# Patient Record
(demographics unavailable — no encounter records)

---

## 2024-10-09 NOTE — DATA REVIEWED
[FreeTextEntry1] : 7/29/24 NFR, bilat sMMG: Scattered FG. Two spiculated masses with associated architectural distortion are seen in the central inner right breast. The more anterior mass at the 9:00 axis demonstrates associated calcifications. No suspicious mass, suspicious microcalcifications, or other sign of malignancy is identified in the left breast. Additional scattered benign type calcifications are noted. FERNANDO grade 0. Impression: Spiculated masses with associated architectural distortion in the inner right breast. The patient will be recalled for additional mmg views and targeted right breast US for further eval. BR0  9/17/24 NFR, R dMMG and limited US: Scattered FG. Three medial / approx 3:00 RIGHT breast lesions,  by up approx 5.5cm: Mid posterior/N8, 3:00 position: 10 mm spiculated mass. Mid/N5, 3:00 position: 4 mm spiculated mass or focal distortion. Anterior/N2 subareolar position: 6 mm spiculated mass R US: Irregular hypoechoic masses correspond to mammographic abnormalities above: 2-3:00 N4 position: 10 x 8mm hypoechoic mass. 1-2:00 N4 position: 8mm indistinct hypoechoic mass. 1-2:00 N1 position: 10 mm hypoechoic shadowing mass No suspicious RIGHT axillary lymphadenopathy. Impression: Three suspicious medial RIGHT breast masses. Ultrasound-guided biopsy is recommended of two outer perimeter masses or all three RIGHT breast masses in medial RIGHT breast. BR5  9/26/24 [facility], Right 2:00 4N US guided bx: Invasive lobular carcinoma, pleomorphic type, measures at least 9mm. Solid carcinoma in situ Gr2. Lymphovascular permeation by tumor not seen.  Right 1:00 1N: Invasive lobular carcinoma similar to that in part 1, measures at least 10mm. Solid carcinoma in situ Gr2. No lymphovascular permeation by tumor is seen. Calcs present in benign breast tissue. ER+ 90%, ND+ 80%. Her2 negative. Concordant. Rec surgical or oncologic management

## 2024-10-09 NOTE — HISTORY OF PRESENT ILLNESS
[FreeTextEntry1] : Referred by Dr. Tanvir Dhillon PCP Nik Cannon MD  Patient is a 69-year-old white female here today for consultation for recent diagnosis of Right ILC x2 foci, 2:00 4N, 1:00 1N, ER+ 90%, WV+ 80%. Her2 negative (2 out of 3 abnormal lesions) Pt denies any breast lesions, discharge or masses.  7/29/24 NFR, bilat sMMG: Scattered FG. Two spiculated masses with associated architectural distortion are seen in the central inner right breast. The more anterior mass at the 9:00 axis demonstrates associated calcifications. No suspicious mass, suspicious microcalcifications, or other sign of malignancy is identified in the left breast. Additional scattered benign type calcifications are noted. FERNANDO grade 0. Impression: Spiculated masses with associated architectural distortion in the inner right breast. The patient will be recalled for additional mmg views and targeted right breast US for further eval. BR0  9/17/24 NFR, R dMMG and limited US: Scattered FG. Three medial / approx 3:00 RIGHT breast lesions,  by up approx 5.5cm: Mid posterior/N8, 3:00 position: 10 mm spiculated mass. Mid/N5, 3:00 position: 4 mm spiculated mass or focal distortion. Anterior/N2 subareolar position: 6 mm spiculated mass R US: Irregular hypoechoic masses correspond to mammographic abnormalities above: 2-3:00 N4 position: 10 x 8mm hypoechoic mass. 1-2:00 N4 position: 8mm indistinct hypoechoic mass. 1-2:00 N1 position: 10 mm hypoechoic shadowing mass No suspicious RIGHT axillary lymphadenopathy. Impression: Three suspicious medial RIGHT breast masses. Ultrasound-guided biopsy is recommended of two outer perimeter masses or all three RIGHT breast masses in medial RIGHT breast. BR5  9/26/24 [facility], Right 2:00 4N US guided bx: Invasive lobular carcinoma, pleomorphic type, measures at least 9mm. Solid carcinoma in situ Gr2. Lymphovascular permeation by tumor not seen.  Right 1:00 1N: Invasive lobular carcinoma similar to that in part 1, measures at least 10mm. Solid carcinoma in situ Gr2. No lymphovascular permeation by tumor is seen. Calcs present in benign breast tissue. ER+ 90%, WV+ 80%. Her2 negative. Concordant. Rec surgical or oncologic management  Fhx: ?Abdominal Cancer: Father, 93 y/o.  Pt lives in [place].

## 2024-10-09 NOTE — PAST MEDICAL HISTORY
[Menarche Age ____] : age at menarche was [unfilled] [Menopause Age____] : age at menopause was [unfilled] [History of Hormone Replacement Treatment] : has a history of hormone replacement treatment [Approximately ___] : the LMP was approximately [unfilled] [Total Preg ___] : G[unfilled] [Live Births ___] : P[unfilled]  [Living ___] : Living: [unfilled] [Age At Live Birth ___] : Age at live birth: [unfilled] [FreeTextEntry7] : Yes, 4 months [FreeTextEntry8] : Yes

## 2024-10-09 NOTE — ASSESSMENT
[FreeTextEntry1] : Referred by Dr. Tanvir Dhillon PCP Nik Cannon MD  Patient is a 69-year-old white female here today for consultation for recent diagnosis of Right ILC x2 foci, 2:00 4N, 1:00 1N, ER+ 90%, PA+ 80%. Her2 negative (2 out of 3 abnormal lesions) Pt denies any breast lesions, discharge or masses.  7/29/24 NFR, bilat sMMG: Scattered FG. Two spiculated masses with associated architectural distortion are seen in the central inner right breast. The more anterior mass at the 9:00 axis demonstrates associated calcifications. No suspicious mass, suspicious microcalcifications, or other sign of malignancy is identified in the left breast. Additional scattered benign type calcifications are noted. FERNANDO grade 0. Impression: Spiculated masses with associated architectural distortion in the inner right breast. The patient will be recalled for additional mmg views and targeted right breast US for further eval. BR0  9/17/24 NFR, R dMMG and limited US: Scattered FG. Three medial / approx 3:00 RIGHT breast lesions,  by up approx 5.5cm: Mid posterior/N8, 3:00 position: 10 mm spiculated mass. Mid/N5, 3:00 position: 4 mm spiculated mass or focal distortion. Anterior/N2 subareolar position: 6 mm spiculated mass R US: Irregular hypoechoic masses correspond to mammographic abnormalities above: 2-3:00 N4 position: 10 x 8mm hypoechoic mass. 1-2:00 N4 position: 8mm indistinct hypoechoic mass. 1-2:00 N1 position: 10 mm hypoechoic shadowing mass No suspicious RIGHT axillary lymphadenopathy. Impression: Three suspicious medial RIGHT breast masses. Ultrasound-guided biopsy is recommended of two outer perimeter masses or all three RIGHT breast masses in medial RIGHT breast. BR5  9/26/24 [facility], Right 2:00 4N US guided bx: Invasive lobular carcinoma, pleomorphic type, measures at least 9mm. Solid carcinoma in situ Gr2. Lymphovascular permeation by tumor not seen.  Right 1:00 1N: Invasive lobular carcinoma similar to that in part 1, measures at least 10mm. Solid carcinoma in situ Gr2. No lymphovascular permeation by tumor is seen. Calcs present in benign breast tissue. ER+ 90%, PA+ 80%. Her2 negative. Concordant. Rec surgical or oncologic management  Fhx: ?Abdominal Cancer: Father, 93 y/o.  Pt lives in [place].   CBE:  Right: Slight ecchymosis from bx x2 sites. Breast is nodular irineo at 1:00 3N nodular area, ? lesion vs post bx change.  No discrete mass. Left negative. No axillary or SC lymphadenopathy.   Very long discussion with patient regarding the biopsy results from 9/26/24 from [facility] showing ILC  of the R breast at the 1 o'clock, 1 cm FTN, Grade 2 , ER 90% , PA 80% and [id number] neg , measuring 1cm. A second site also with ILC at 2:00 4N. The extremes were biopsied but there was a third abnormal lesion.  Reviewed extensively the options of mastectomy vs lumpectomy with the pros and cons for both. With mastectomy, options of immediate or delayed reconstruction (expander/implant vs autologous flap) discussed and reconstruction handout given and contact for plastic surgeon. Also discussed usually no radiation with mastectomy but pending final pathology.  Discussed usually no reexcision with mastectomy. Reviewed will need drain(s) with mastectomy. With lumpectomy, will need radiation and treatment was reviewed. Would need localization. Also discussed will need negative margins and if too close or positive, may need reexcision(s).   If unable to achieve negative margins with reexcision(s), she  may need mastectomy. Padmini may be an oncoplastic surgery candidate since lesions are in UIQ however the multifocal nature of her disease may be too extensive for lumpectomy.  Rec MRI for further evaluation.   Reviewed SLNBx (dye and radioactive tracer). With mastectomy if SLN is positive on permanent, may need delay ALND or postmastectomy radiation.  With lumpectomy, given Z-11, no ALND with positive LN as long as <4 since adjuvant radiation is to be given. If SLN are clinically suspicious, may check frozen and pending results, may proceed to ALND.  Highly unlikely given negative on clinical exam and on studies.  Discussed risks and complications including lymphedema and handout given out. Discussed probably will need hormonal therapy.  [id number] was neg on one of the tumors but may need receptors on the other tumors to determine need of  herceptin.  Need for chemotherapy pending results of final pathology. May need additional tumor analysis such as oncotype Dx to determine need for chemotherapy. Discussed survival with BCT vs mastectomy is the same and recurrence risk with mastectomy is not 0%.  Rec genetic testing and SOZO. Pt is clinical stage 1. She is leaning towards Mastectomy and prophylactic on the left. Discussed no evidence of increased survival with prophlyactic mastectomy. Pt wondering about NSM, one of the lesions is 1 Cm ftn and discussed would check SA tissue and if positive, may need delayed removal of NAC. ALso would be cosmetic only and could also have NAC recon with tattooing as another option.  Reviewed with patient her exercise regimen. She does exercise regularly. Discussed exercise oncology and the importance of regular exercise, 300min per week, including cardiac and resistance training to overall health and also reduction of 20-30% for recurrence. Discussed virtual exercise programs, [employer]  Contact given for Dr. Efren Calloway for activity and further exercise oncology review. Pt used to smoke as a teenager and now Vapes, rec cessation. Also rec discontinue prempro, she has been on it for 20 years.   PLAN: MRI Genetic testing and SOZO at [facility] Appt with Dr Chaney, leaning towards bilat mastectomy and possible VICENTE.  Stop Prempro

## 2024-10-09 NOTE — PHYSICAL EXAM
[Normocephalic] : normocephalic [Atraumatic] : atraumatic [Supple] : supple [No Supraclavicular Adenopathy] : no supraclavicular adenopathy [No Thyromegaly] : no thyromegaly [Examined in the supine and seated position] : examined in the supine and seated position [Bra Size: ___] : Bra Size: [unfilled] [No dominant masses] : no dominant masses in right breast  [No dominant masses] : no dominant masses left breast [No Nipple Retraction] : no left nipple retraction [No Nipple Discharge] : no left nipple discharge [No Axillary Lymphadenopathy] : no left axillary lymphadenopathy [No Edema] : no edema [No Swelling] : no swelling [Full ROM] : full range of motion [No Rashes] : no rashes [No Ulceration] : no ulceration [de-identified] : Right: Slight ecchymosis from bx x2 sites. Breast is nodular irineo at 1:00 3N nodular area, ? lesion vs post bx change.  No discrete mass. Left negative. [TextEntry] : Psych: Loquacious, Appropriate, NAD, A&Ox3 Neuro: Intact grossly, gait normal

## 2024-10-09 NOTE — ASSESSMENT
[FreeTextEntry1] : Referred by Dr. Tanvir Dhillon PCP Nik Cannon MD  Patient is a 69-year-old white female here today for consultation for recent diagnosis of Right ILC x2 foci, 2:00 4N, 1:00 1N, ER+ 90%, OK+ 80%. Her2 negative (2 out of 3 abnormal lesions) Pt denies any breast lesions, discharge or masses.  7/29/24 NFR, bilat sMMG: Scattered FG. Two spiculated masses with associated architectural distortion are seen in the central inner right breast. The more anterior mass at the 9:00 axis demonstrates associated calcifications. No suspicious mass, suspicious microcalcifications, or other sign of malignancy is identified in the left breast. Additional scattered benign type calcifications are noted. FERNANDO grade 0. Impression: Spiculated masses with associated architectural distortion in the inner right breast. The patient will be recalled for additional mmg views and targeted right breast US for further eval. BR0  9/17/24 NFR, R dMMG and limited US: Scattered FG. Three medial / approx 3:00 RIGHT breast lesions,  by up approx 5.5cm: Mid posterior/N8, 3:00 position: 10 mm spiculated mass. Mid/N5, 3:00 position: 4 mm spiculated mass or focal distortion. Anterior/N2 subareolar position: 6 mm spiculated mass R US: Irregular hypoechoic masses correspond to mammographic abnormalities above: 2-3:00 N4 position: 10 x 8mm hypoechoic mass. 1-2:00 N4 position: 8mm indistinct hypoechoic mass. 1-2:00 N1 position: 10 mm hypoechoic shadowing mass No suspicious RIGHT axillary lymphadenopathy. Impression: Three suspicious medial RIGHT breast masses. Ultrasound-guided biopsy is recommended of two outer perimeter masses or all three RIGHT breast masses in medial RIGHT breast. BR5  9/26/24 [facility], Right 2:00 4N US guided bx: Invasive lobular carcinoma, pleomorphic type, measures at least 9mm. Solid carcinoma in situ Gr2. Lymphovascular permeation by tumor not seen.  Right 1:00 1N: Invasive lobular carcinoma similar to that in part 1, measures at least 10mm. Solid carcinoma in situ Gr2. No lymphovascular permeation by tumor is seen. Calcs present in benign breast tissue. ER+ 90%, OK+ 80%. Her2 negative. Concordant. Rec surgical or oncologic management  Fhx: ?Abdominal Cancer: Father, 93 y/o.  Pt lives in [place].   CBE:  Right: Slight ecchymosis from bx x2 sites. Breast is nodular irineo at 1:00 3N nodular area, ? lesion vs post bx change.  No discrete mass. Left negative. No axillary or SC lymphadenopathy.   Very long discussion with patient regarding the biopsy results from 9/26/24 from [facility] showing ILC  of the R breast at the 1 o'clock, 1 cm FTN, Grade 2 , ER 90% , OK 80% and [id number] neg , measuring 1cm. A second site also with ILC at 2:00 4N. The extremes were biopsied but there was a third abnormal lesion.  Reviewed extensively the options of mastectomy vs lumpectomy with the pros and cons for both. With mastectomy, options of immediate or delayed reconstruction (expander/implant vs autologous flap) discussed and reconstruction handout given and contact for plastic surgeon. Also discussed usually no radiation with mastectomy but pending final pathology.  Discussed usually no reexcision with mastectomy. Reviewed will need drain(s) with mastectomy. With lumpectomy, will need radiation and treatment was reviewed. Would need localization. Also discussed will need negative margins and if too close or positive, may need reexcision(s).   If unable to achieve negative margins with reexcision(s), she  may need mastectomy. Padmini may be an oncoplastic surgery candidate since lesions are in UIQ however the multifocal nature of her disease may be too extensive for lumpectomy.  Rec MRI for further evaluation.   Reviewed SLNBx (dye and radioactive tracer). With mastectomy if SLN is positive on permanent, may need delay ALND or postmastectomy radiation.  With lumpectomy, given Z-11, no ALND with positive LN as long as <4 since adjuvant radiation is to be given. If SLN are clinically suspicious, may check frozen and pending results, may proceed to ALND.  Highly unlikely given negative on clinical exam and on studies.  Discussed risks and complications including lymphedema and handout given out. Discussed probably will need hormonal therapy.  [id number] was neg on one of the tumors but may need receptors on the other tumors to determine need of  herceptin.  Need for chemotherapy pending results of final pathology. May need additional tumor analysis such as oncotype Dx to determine need for chemotherapy. Discussed survival with BCT vs mastectomy is the same and recurrence risk with mastectomy is not 0%.  Rec genetic testing and [facility]ZO. Pt is clinical stage 1. She is leaning towards Mastectomy and prophylactic on the left. Discussed no evidence of increased survival with prophlyactic mastectomy. Pt wondering about NSM, one of the lesions is 1 Cm ftn and discussed would check SA tissue and if positive, may need delayed removal of NAC. ALso would be cosmetic only and could also have NAC recon with tattooing as another option.  Reviewed with patient her exercise regimen. She does exercise regularly. Discussed exercise oncology and the importance of regular exercise, 300min per week, including cardiac and resistance training to overall health and also reduction of 20-30% for recurrence. Discussed virtual exercise programs, [employer].  Contact given for Dr. Efren Calloway for activity and further exercise oncology review. Pt used to smoke as a teenager and now Vapes, rec cessation. Also rec discontinue prempro, she has been on it for 20 years.   PLAN: MRI Genetic testing and [facility] at [facility] Appt with Dr Chaney, leaning towards bilat mastectomy and possible VICENTE.  Stop Prempro

## 2024-10-09 NOTE — DATA REVIEWED
[FreeTextEntry1] : 7/29/24 NFR, bilat sMMG: Scattered FG. Two spiculated masses with associated architectural distortion are seen in the central inner right breast. The more anterior mass at the 9:00 axis demonstrates associated calcifications. No suspicious mass, suspicious microcalcifications, or other sign of malignancy is identified in the left breast. Additional scattered benign type calcifications are noted. FERNANDO grade 0. Impression: Spiculated masses with associated architectural distortion in the inner right breast. The patient will be recalled for additional mmg views and targeted right breast US for further eval. BR0  9/17/24 NFR, R dMMG and limited US: Scattered FG. Three medial / approx 3:00 RIGHT breast lesions,  by up approx 5.5cm: Mid posterior/N8, 3:00 position: 10 mm spiculated mass. Mid/N5, 3:00 position: 4 mm spiculated mass or focal distortion. Anterior/N2 subareolar position: 6 mm spiculated mass R US: Irregular hypoechoic masses correspond to mammographic abnormalities above: 2-3:00 N4 position: 10 x 8mm hypoechoic mass. 1-2:00 N4 position: 8mm indistinct hypoechoic mass. 1-2:00 N1 position: 10 mm hypoechoic shadowing mass No suspicious RIGHT axillary lymphadenopathy. Impression: Three suspicious medial RIGHT breast masses. Ultrasound-guided biopsy is recommended of two outer perimeter masses or all three RIGHT breast masses in medial RIGHT breast. BR5  9/26/24 [facility], Right 2:00 4N US guided bx: Invasive lobular carcinoma, pleomorphic type, measures at least 9mm. Solid carcinoma in situ Gr2. Lymphovascular permeation by tumor not seen.  Right 1:00 1N: Invasive lobular carcinoma similar to that in part 1, measures at least 10mm. Solid carcinoma in situ Gr2. No lymphovascular permeation by tumor is seen. Calcs present in benign breast tissue. ER+ 90%, FL+ 80%. Her2 negative. Concordant. Rec surgical or oncologic management

## 2024-10-09 NOTE — DATA REVIEWED
[FreeTextEntry1] : 7/29/24 NFR, bilat sMMG: Scattered FG. Two spiculated masses with associated architectural distortion are seen in the central inner right breast. The more anterior mass at the 9:00 axis demonstrates associated calcifications. No suspicious mass, suspicious microcalcifications, or other sign of malignancy is identified in the left breast. Additional scattered benign type calcifications are noted. FERNANDO grade 0. Impression: Spiculated masses with associated architectural distortion in the inner right breast. The patient will be recalled for additional mmg views and targeted right breast US for further eval. BR0  9/17/24 NFR, R dMMG and limited US: Scattered FG. Three medial / approx 3:00 RIGHT breast lesions,  by up approx 5.5cm: Mid posterior/N8, 3:00 position: 10 mm spiculated mass. Mid/N5, 3:00 position: 4 mm spiculated mass or focal distortion. Anterior/N2 subareolar position: 6 mm spiculated mass R US: Irregular hypoechoic masses correspond to mammographic abnormalities above: 2-3:00 N4 position: 10 x 8mm hypoechoic mass. 1-2:00 N4 position: 8mm indistinct hypoechoic mass. 1-2:00 N1 position: 10 mm hypoechoic shadowing mass No suspicious RIGHT axillary lymphadenopathy. Impression: Three suspicious medial RIGHT breast masses. Ultrasound-guided biopsy is recommended of two outer perimeter masses or all three RIGHT breast masses in medial RIGHT breast. BR5  9/26/24 [facility], Right 2:00 4N US guided bx: Invasive lobular carcinoma, pleomorphic type, measures at least 9mm. Solid carcinoma in situ Gr2. Lymphovascular permeation by tumor not seen.  Right 1:00 1N: Invasive lobular carcinoma similar to that in part 1, measures at least 10mm. Solid carcinoma in situ Gr2. No lymphovascular permeation by tumor is seen. Calcs present in benign breast tissue. ER+ 90%, [place]+ 80%. Her2 negative. Concordant. Rec surgical or oncologic management

## 2024-10-09 NOTE — PHYSICAL EXAM
[Normocephalic] : normocephalic [Atraumatic] : atraumatic [Supple] : supple [No Supraclavicular Adenopathy] : no supraclavicular adenopathy [No Thyromegaly] : no thyromegaly [Examined in the supine and seated position] : examined in the supine and seated position [Bra Size: ___] : Bra Size: [unfilled] [No dominant masses] : no dominant masses in right breast  [No dominant masses] : no dominant masses left breast [No Nipple Retraction] : no left nipple retraction [No Nipple Discharge] : no left nipple discharge [No Axillary Lymphadenopathy] : no left axillary lymphadenopathy [No Edema] : no edema [No Swelling] : no swelling [Full ROM] : full range of motion [No Rashes] : no rashes [No Ulceration] : no ulceration [de-identified] : Right: Slight ecchymosis from bx x2 sites. Breast is nodular irineo at 1:00 3N nodular area, ? lesion vs post bx change.  No discrete mass. Left negative. [TextEntry] : Psych: Loquacious, Appropriate, NAD, A&Ox3 Neuro: Intact grossly, gait normal

## 2024-10-09 NOTE — ASSESSMENT
[FreeTextEntry1] : Referred by Dr. Tanvir Dhillon PCP Nik Cannon MD  Patient is a 69-year-old white female here today for consultation for recent diagnosis of Right ILC x2 foci, 2:00 4N, 1:00 1N, ER+ 90%, ND+ 80%. Her2 negative (2 out of 3 abnormal lesions) Pt denies any breast lesions, discharge or masses.  7/29/24 NFR, bilat sMMG: Scattered FG. Two spiculated masses with associated architectural distortion are seen in the central inner right breast. The more anterior mass at the 9:00 axis demonstrates associated calcifications. No suspicious mass, suspicious microcalcifications, or other sign of malignancy is identified in the left breast. Additional scattered benign type calcifications are noted. FERNANDO grade 0. Impression: Spiculated masses with associated architectural distortion in the inner right breast. The patient will be recalled for additional mmg views and targeted right breast US for further eval. BR0  9/17/24 NFR, R dMMG and limited US: Scattered FG. Three medial / approx 3:00 RIGHT breast lesions,  by up approx 5.5cm: Mid posterior/N8, 3:00 position: 10 mm spiculated mass. Mid/N5, 3:00 position: 4 mm spiculated mass or focal distortion. Anterior/N2 subareolar position: 6 mm spiculated mass R US: Irregular hypoechoic masses correspond to mammographic abnormalities above: 2-3:00 N4 position: 10 x 8mm hypoechoic mass. 1-2:00 N4 position: 8mm indistinct hypoechoic mass. 1-2:00 N1 position: 10 mm hypoechoic shadowing mass No suspicious RIGHT axillary lymphadenopathy. Impression: Three suspicious medial RIGHT breast masses. Ultrasound-guided biopsy is recommended of two outer perimeter masses or all three RIGHT breast masses in medial RIGHT breast. BR5  9/26/24 [facility], Right 2:00 4N US guided bx: Invasive lobular carcinoma, pleomorphic type, measures at least 9mm. Solid carcinoma in situ Gr2. Lymphovascular permeation by tumor not seen.  Right 1:00 1N: Invasive lobular carcinoma similar to that in part 1, measures at least 10mm. Solid carcinoma in situ Gr2. No lymphovascular permeation by tumor is seen. Calcs present in benign breast tissue. ER+ 90%, ND+ 80%. Her2 negative. Concordant. Rec surgical or oncologic management  Fhx: ?Abdominal Cancer: Father, 91 y/o.  Pt lives in [place].   CBE:  Right: Slight ecchymosis from bx x2 sites. Breast is nodular irineo at 1:00 3N nodular area, ? lesion vs post bx change.  No discrete mass. Left negative. No axillary or SC lymphadenopathy.   Very long discussion with patient regarding the biopsy results from 9/26/24 from [facility] showing ILC  of the R breast at the 1 o'clock, 1 cm FTN, Grade 2 , ER 90% , ND 80% and Msw8azg neg , measuring 1cm. A second site also with ILC at 2:00 4N. The extremes were biopsied but there was a third abnormal lesion.  Reviewed extensively the options of mastectomy vs lumpectomy with the pros and cons for both. With mastectomy, options of immediate or delayed reconstruction (expander/implant vs autologous flap) discussed and reconstruction handout given and contact for plastic surgeon. Also discussed usually no radiation with mastectomy but pending final pathology.  Discussed usually no reexcision with mastectomy. Reviewed will need drain(s) with mastectomy. With lumpectomy, will need radiation and treatment was reviewed. Would need localization. Also discussed will need negative margins and if too close or positive, may need reexcision(s).   If unable to achieve negative margins with reexcision(s), she  may need mastectomy. Padmini may be an oncoplastic surgery candidate since lesions are in UIQ however the multifocal nature of her disease may be too extensive for lumpectomy.  Rec MRI for further evaluation.   Reviewed SLNBx (dye and radioactive tracer). With mastectomy if SLN is positive on permanent, may need delay ALND or postmastectomy radiation.  With lumpectomy, given Z-11, no ALND with positive LN as long as <4 since adjuvant radiation is to be given. If SLN are clinically suspicious, may check frozen and pending results, may proceed to ALND.  Highly unlikely given negative on clinical exam and on studies.  Discussed risks and complications including lymphedema and handout given out. Discussed probably will need hormonal therapy.  [id number] was neg on one of the tumors but may need receptors on the other tumors to determine need of  herceptin.  Need for chemotherapy pending results of final pathology. May need additional tumor analysis such as oncotype Dx to determine need for chemotherapy. Discussed survival with BCT vs mastectomy is the same and recurrence risk with mastectomy is not 0%.  Rec genetic testing and SOZO. Pt is clinical stage 1. She is leaning towards Mastectomy and prophylactic on the left. Discussed no evidence of increased survival with prophlyactic mastectomy. Pt wondering about NSM, one of the lesions is 1 Cm ftn and discussed would check SA tissue and if positive, may need delayed removal of NAC. ALso would be cosmetic only and could also have NAC recon with tattooing as another option.  Reviewed with patient her exercise regimen. She does exercise regularly. Discussed exercise oncology and the importance of regular exercise, 300min per week, including cardiac and resistance training to overall health and also reduction of 20-30% for recurrence. Discussed virtual exercise programs, [employer].  Contact given for Dr. Efren Calloway for activity and further exercise oncology review. Pt used to smoke as a teenager and now Vapes, rec cessation. Also rec discontinue prempro, she has been on it for 20 years.   PLAN: MRI Genetic testing and SOZO at [facility] Appt with Dr Chaney, leaning towards bilat mastectomy and possible VICENTE.  Stop Prempro

## 2024-10-09 NOTE — HISTORY OF PRESENT ILLNESS
[FreeTextEntry1] : Referred by Dr. Tanvir Dhillon PCP Nik Cannon MD  Patient is a 69-year-old white female here today for consultation for recent diagnosis of Right ILC x2 foci, 2:00 4N, 1:00 1N, ER+ 90%, OH+ 80%. Her2 negative (2 out of 3 abnormal lesions) Pt denies any breast lesions, discharge or masses.  7/29/24 NFR, bilat sMMG: Scattered FG. Two spiculated masses with associated architectural distortion are seen in the central inner right breast. The more anterior mass at the 9:00 axis demonstrates associated calcifications. No suspicious mass, suspicious microcalcifications, or other sign of malignancy is identified in the left breast. Additional scattered benign type calcifications are noted. FERNANDO grade 0. Impression: Spiculated masses with associated architectural distortion in the inner right breast. The patient will be recalled for additional mmg views and targeted right breast US for further eval. BR0  9/17/24 NFR, R dMMG and limited US: Scattered FG. Three medial / approx 3:00 RIGHT breast lesions,  by up approx 5.5cm: Mid posterior/N8, 3:00 position: 10 mm spiculated mass. Mid/N5, 3:00 position: 4 mm spiculated mass or focal distortion. Anterior/N2 subareolar position: 6 mm spiculated mass R US: Irregular hypoechoic masses correspond to mammographic abnormalities above: 2-3:00 N4 position: 10 x 8mm hypoechoic mass. 1-2:00 N4 position: 8mm indistinct hypoechoic mass. 1-2:00 N1 position: 10 mm hypoechoic shadowing mass No suspicious RIGHT axillary lymphadenopathy. Impression: Three suspicious medial RIGHT breast masses. Ultrasound-guided biopsy is recommended of two outer perimeter masses or all three RIGHT breast masses in medial RIGHT breast. BR5  9/26/24 [facility], Right 2:00 4N US guided bx: Invasive lobular carcinoma, pleomorphic type, measures at least 9mm. Solid carcinoma in situ Gr2. Lymphovascular permeation by tumor not seen.  Right 1:00 1N: Invasive lobular carcinoma similar to that in part 1, measures at least 10mm. Solid carcinoma in situ Gr2. No lymphovascular permeation by tumor is seen. Calcs present in benign breast tissue. ER+ 90%, OH+ 80%. Her2 negative. Concordant. Rec surgical or oncologic management  Fhx: ?Abdominal Cancer: Father, 91 y/o.  Pt lives in [place].

## 2024-10-09 NOTE — PHYSICAL EXAM
[Normocephalic] : normocephalic [Atraumatic] : atraumatic [Supple] : supple [No Supraclavicular Adenopathy] : no supraclavicular adenopathy [No Thyromegaly] : no thyromegaly [Examined in the supine and seated position] : examined in the supine and seated position [Bra Size: ___] : Bra Size: [unfilled] [No dominant masses] : no dominant masses in right breast  [No dominant masses] : no dominant masses left breast [No Nipple Retraction] : no left nipple retraction [No Nipple Discharge] : no left nipple discharge [No Axillary Lymphadenopathy] : no left axillary lymphadenopathy [No Edema] : no edema [No Swelling] : no swelling [Full ROM] : full range of motion [No Rashes] : no rashes [No Ulceration] : no ulceration [de-identified] : Right: Slight ecchymosis from bx x2 sites. Breast is nodular irineo at 1:00 3N nodular area, ? lesion vs post bx change.  No discrete mass. Left negative. [TextEntry] : Psych: Loquacious, Appropriate, NAD, A&Ox3 Neuro: Intact grossly, gait normal

## 2024-10-09 NOTE — HISTORY OF PRESENT ILLNESS
[FreeTextEntry1] : Referred by Dr. Tanvir Dhillon PCP Nik Cannon MD  Patient is a 69-year-old white female here today for consultation for recent diagnosis of Right ILC x2 foci, 2:00 4N, 1:00 1N, ER+ 90%, OK+ 80%. Her2 negative (2 out of 3 abnormal lesions) Pt denies any breast lesions, discharge or masses.  7/29/24 NFR, bilat sMMG: Scattered FG. Two spiculated masses with associated architectural distortion are seen in the central inner right breast. The more anterior mass at the 9:00 axis demonstrates associated calcifications. No suspicious mass, suspicious microcalcifications, or other sign of malignancy is identified in the left breast. Additional scattered benign type calcifications are noted. FERNANDO grade 0. Impression: Spiculated masses with associated architectural distortion in the inner right breast. The patient will be recalled for additional mmg views and targeted right breast US for further eval. BR0  9/17/24 NFR, R dMMG and limited US: Scattered FG. Three medial / approx 3:00 RIGHT breast lesions,  by up approx 5.5cm: Mid posterior/N8, 3:00 position: 10 mm spiculated mass. Mid/N5, 3:00 position: 4 mm spiculated mass or focal distortion. Anterior/N2 subareolar position: 6 mm spiculated mass R US: Irregular hypoechoic masses correspond to mammographic abnormalities above: 2-3:00 N4 position: 10 x 8mm hypoechoic mass. 1-2:00 N4 position: 8mm indistinct hypoechoic mass. 1-2:00 N1 position: 10 mm hypoechoic shadowing mass No suspicious RIGHT axillary lymphadenopathy. Impression: Three suspicious medial RIGHT breast masses. Ultrasound-guided biopsy is recommended of two outer perimeter masses or all three RIGHT breast masses in medial RIGHT breast. BR5  9/26/24 [facility], Right 2:00 4N US guided bx: Invasive lobular carcinoma, pleomorphic type, measures at least 9mm. Solid carcinoma in situ Gr2. Lymphovascular permeation by tumor not seen.  Right 1:00 1N: Invasive lobular carcinoma similar to that in part 1, measures at least 10mm. Solid carcinoma in situ Gr2. No lymphovascular permeation by tumor is seen. Calcs present in benign breast tissue. ER+ 90%, OK+ 80%. Her2 negative. Concordant. Rec surgical or oncologic management  Fhx: ?Abdominal Cancer: Father, 91 y/o.  Pt lives in [place].

## 2024-10-22 NOTE — PHYSICAL EXAM
[NI] : Normal [Bra Size: _______] : Bra Size: [unfilled] [de-identified] : NL respiratory effort noted  [de-identified] : Bilateral grade 3+ ptosis, large projecting nipples. Involutional change. [de-identified] : Very low Pfannenstiel scar, in the upper mons. adequate adipose and laxity for bilateral reconstruction. [de-identified] : Tan, solar skin damage

## 2024-10-22 NOTE — ADDENDUM
[FreeTextEntry1] : All medical record entries were at my direction and personally dictated by me (Dr. Kaleigh Chaney). I have reviewed the chart and agree that the record accurately reflects my personal performance of the history, physical exam, assessment and plan.  Pt was initially very motivated to have bilateral VICENTE flap reconstruction, but after speaking with an aquaintance who had mastectomy and implants 24 years ago and the MRI nurse who had two friends undergo VICENTE flaps with a difficult course, she has changed her mind. Following our consultation, she is finding that she may consider flap reconstruction. Due to her desire to get back to [place] and have this initial surgery behind her, she has opted for a delayed-immediate approach with initial tissue expander reconstruction at the time of mastectomy and then decide whether her second stage will be implant exchange or autologous flaps.  She was very happy with this approach. She likes the idea of having lifted, smaller reconstructed breasts and would not like nipple projection in the future (she reports that she has been covering her projecting nipples all of her life). Surgical plan: bilateral tissue expander placement (35326-07), bilateral acellular dermal matrix (04584-82), adjacent tissue transfer (12064/2) and indocyanine green dye tissue angiography (42756). ICD Z90.13, C50.911 We will need to schedule her for preoperative MRA before the John are placed because they contain magnets.

## 2024-10-22 NOTE — ADDENDUM
[FreeTextEntry1] : All medical record entries were at my direction and personally dictated by me (Dr. Kaleigh Chaney). I have reviewed the chart and agree that the record accurately reflects my personal performance of the history, physical exam, assessment and plan.  Pt was initially very motivated to have bilateral VICENTE flap reconstruction, but after speaking with an aquaintance who had mastectomy and implants 24 years ago and the MRI nurse who had two friends undergo VICENTE flaps with a difficult course, she has changed her mind. Following our consultation, she is finding that she may consider flap reconstruction. Due to her desire to get back to [place] and have this initial surgery behind her, she has opted for a delayed-immediate approach with initial tissue expander reconstruction at the time of mastectomy and then decide whether her second stage will be implant exchange or autologous flaps.  She was very happy with this approach. She likes the idea of having lifted, smaller reconstructed breasts and would not like nipple projection in the future (she reports that she has been covering her projecting nipples all of her life). Surgical plan: bilateral tissue expander placement (53823-48), bilateral acellular dermal matrix (39745-22), adjacent tissue transfer (15045/2) and indocyanine green dye tissue angiography (31388). ICD Z90.13, C50.911 We will need to schedule her for preoperative MRA before the John are placed because they contain magnets.

## 2024-10-22 NOTE — HISTORY OF PRESENT ILLNESS
[FreeTextEntry1] : Patient presents for consultation for recent diagnosis of right breast cancer.  She is not a candidate for lumpectomy and would prefer bilateral mastectomies anyway.  She has a very active lifestyle and also lives in [place] during the winter months and really wants to get back there.  She states that she will do whatever she needs to do for her health, but just wants to let us know her lifestyle.  She drinks and vapes THC about three times a week, but states that she can stop at any time if necessary.  She is not certain of her desire for implant-based or VICENTE reconstruction at the second stage. She wears a C cup bra.

## 2024-10-22 NOTE — PHYSICAL EXAM
[NI] : Normal [Bra Size: _______] : Bra Size: [unfilled] [de-identified] : NL respiratory effort noted  [de-identified] : Bilateral grade 3+ ptosis, large projecting nipples. Involutional change. [de-identified] : Very low Pfannenstiel scar, in the upper mons. adequate adipose and laxity for bilateral reconstruction. [de-identified] : Tan, solar skin damage

## 2024-10-22 NOTE — PHYSICAL EXAM
[NI] : Normal [Bra Size: _______] : Bra Size: [unfilled] [de-identified] : NL respiratory effort noted  [de-identified] : Bilateral grade 3+ ptosis, large projecting nipples. Involutional change. [de-identified] : Very low Pfannenstiel scar, in the upper mons. adequate adipose and laxity for bilateral reconstruction. [de-identified] : Tan, solar skin damage

## 2024-10-22 NOTE — PHYSICAL EXAM
[NI] : Normal [Bra Size: _______] : Bra Size: [unfilled] [de-identified] : NL respiratory effort noted  [de-identified] : Bilateral grade 3+ ptosis, large projecting nipples. Involutional change. [de-identified] : Very low Pfannenstiel scar, in the upper mons. adequate adipose and laxity for bilateral reconstruction. [de-identified] : Tan, solar skin damage

## 2024-10-22 NOTE — ADDENDUM
[FreeTextEntry1] : All medical record entries were at my direction and personally dictated by me (Dr. Kaleigh Chaney). I have reviewed the chart and agree that the record accurately reflects my personal performance of the history, physical exam, assessment and plan.  Pt was initially very motivated to have bilateral VICENTE flap reconstruction, but after speaking with an aquaintance who had mastectomy and implants 24 years ago and the MRI nurse who had two friends undergo VICENTE flaps with a difficult course, she has changed her mind. Following our consultation, she is finding that she may consider flap reconstruction. Due to her desire to get back to [place] and have this initial surgery behind her, she has opted for a delayed-immediate approach with initial tissue expander reconstruction at the time of mastectomy and then decide whether her second stage will be implant exchange or autologous flaps.  She was very happy with this approach. She likes the idea of having lifted, smaller reconstructed breasts and would not like nipple projection in the future (she reports that she has been covering her projecting nipples all of her life). Surgical plan: bilateral tissue expander placement (04807-87), bilateral acellular dermal matrix (38373-74), adjacent tissue transfer (33136/2) and indocyanine green dye tissue angiography (72579). ICD Z90.13, C50.911 We will need to schedule her for preoperative MRA before the John are placed because they contain magnets.

## 2024-10-22 NOTE — ADDENDUM
[FreeTextEntry1] : All medical record entries were at my direction and personally dictated by me (Dr. Kaleigh Chaney). I have reviewed the chart and agree that the record accurately reflects my personal performance of the history, physical exam, assessment and plan.  Pt was initially very motivated to have bilateral VICENTE flap reconstruction, but after speaking with an aquaintance who had mastectomy and implants 24 years ago and the MRI nurse who had two friends undergo VICENTE flaps with a difficult course, she has changed her mind. Following our consultation, she is finding that she may consider flap reconstruction. Due to her desire to get back to [place] and have this initial surgery behind her, she has opted for a delayed-immediate approach with initial tissue expander reconstruction at the time of mastectomy and then decide whether her second stage will be implant exchange or autologous flaps.  She was very happy with this approach. She likes the idea of having lifted, smaller reconstructed breasts and would not like nipple projection in the future (she reports that she has been covering her projecting nipples all of her life). Surgical plan: bilateral tissue expander placement (02184-42), bilateral acellular dermal matrix (20036-87), adjacent tissue transfer (60863/2) and indocyanine green dye tissue angiography (07381). ICD Z90.13, C50.911 We will need to schedule her for preoperative MRA before the John are placed because they contain magnets.

## 2024-10-22 NOTE — ADDENDUM
[FreeTextEntry1] : All medical record entries were at my direction and personally dictated by me (Dr. Kaleigh Chaney). I have reviewed the chart and agree that the record accurately reflects my personal performance of the history, physical exam, assessment and plan.  Pt was initially very motivated to have bilateral VICENTE flap reconstruction, but after speaking with an aquaintance who had mastectomy and implants 24 years ago and the MRI nurse who had two friends undergo VICENTE flaps with a difficult course, she has changed her mind. Following our consultation, she is finding that she may consider flap reconstruction. Due to her desire to get back to [place] and have this initial surgery behind her, she has opted for a delayed-immediate approach with initial tissue expander reconstruction at the time of mastectomy and then decide whether her second stage will be implant exchange or autologous flaps.  She was very happy with this approach. She likes the idea of having lifted, smaller reconstructed breasts and would not like nipple projection in the future (she reports that she has been covering her projecting nipples all of her life). Surgical plan: bilateral tissue expander placement (81572-61), bilateral acellular dermal matrix (37482-78), adjacent tissue transfer (63746/2) and indocyanine green dye tissue angiography (47095). ICD Z90.13, C50.911 We will need to schedule her for preoperative MRA before the John are placed because they contain magnets.

## 2024-10-22 NOTE — ASSESSMENT
[FreeTextEntry1] : 70 y/o female for consultation for breast reconstruction after diagnosis with right breast cancer   Bilateral mastectomy is being planned, and pt is here for consultation regarding breast reconstruction. We discussed the spectrum of reconstructive options, including no reconstruction, implant based reconstruction and autologous reconstruction. We discussed the risk/benefit ratio for each of these options. We discussed at length the R/B/A of implant based reconstruction, including the need for a two-stage reconstruction. We discussed that implants are not considered lifetime devices, and may need to be replaced in the future. We discussed the risks of infection requiring implant removal, rupture, capsular contracture and implant exposure (especially following radiation). We briefly discussed the R/B/A of abdominally based autologous reconstruction, but she is not really sure that she wants to pursue this any more.   She has opted for bilateral mastectomy followed by possible delayed VICENTE or implant-based reconstruction for the second stage. All questions and concerns addressed. Drain care and pain management instructions reviewed with patient. Plan and patient status as per Dr Chaney.

## 2024-10-22 NOTE — ASSESSMENT
[FreeTextEntry1] : 68 y/o female for consultation for breast reconstruction after diagnosis with right breast cancer   Bilateral mastectomy is being planned, and pt is here for consultation regarding breast reconstruction. We discussed the spectrum of reconstructive options, including no reconstruction, implant based reconstruction and autologous reconstruction. We discussed the risk/benefit ratio for each of these options. We discussed at length the R/B/A of implant based reconstruction, including the need for a two-stage reconstruction. We discussed that implants are not considered lifetime devices, and may need to be replaced in the future. We discussed the risks of infection requiring implant removal, rupture, capsular contracture and implant exposure (especially following radiation). We briefly discussed the R/B/A of abdominally based autologous reconstruction, but she is not really sure that she wants to pursue this any more.   She has opted for bilateral mastectomy followed by possible delayed VICENTE or implant-based reconstruction for the second stage. All questions and concerns addressed. Drain care and pain management instructions reviewed with patient. Plan and patient status as per Dr Chaney.

## 2024-10-22 NOTE — PHYSICAL EXAM
[NI] : Normal [Bra Size: _______] : Bra Size: [unfilled] [de-identified] : NL respiratory effort noted  [de-identified] : Bilateral grade 3+ ptosis, large projecting nipples. Involutional change. [de-identified] : Very low Pfannenstiel scar, in the upper mons. adequate adipose and laxity for bilateral reconstruction. [de-identified] : Tan, solar skin damage

## 2024-10-31 NOTE — PHYSICAL EXAM
[Normal] : well developed, well nourished, no acute distress [Normal Venous Pressure] : normal venous pressure [No Carotid Bruit] : no carotid bruit [Normal S1, S2] : normal S1, S2 [No Murmur] : no murmur [No Rub] : no rub [No Gallop] : no gallop [Clear Lung Fields] : clear lung fields [Soft] : abdomen soft [Non Tender] : non-tender [Normal Bowel Sounds] : normal bowel sounds [No Edema] : no edema [Moves all extremities] : moves all extremities [No Focal Deficits] : no focal deficits [Normal Speech] : normal speech [Alert and Oriented] : alert and oriented [Normal memory] : normal memory

## 2024-10-31 NOTE — DISCUSSION/SUMMARY
[FreeTextEntry1] : 1) HTN - Well controlled BP: 110/76mmHg today - Continue current regimen of telmisartan 80mg daily, chlorthalidone 25mg daily  2) HLD  - Lipid panel from 8/2024: , HDL 61, , TG 73. - Continue dietary and lifestyle modification for now, including Wegovy therapy for weight loss - If LDL remains elevated at followup, consider starting moderate intensity statin therapy  3) Preoperative cardiovascular examination - Asymptomatic with no exertional symptoms, well controlled BP as above - No additional preoperative cardiovascular testing required prior to upcoming procedure [EKG obtained to assist in diagnosis and management of assessed problem(s)] : EKG obtained to assist in diagnosis and management of assessed problem(s)

## 2024-10-31 NOTE — ASSESSMENT
[FreeTextEntry1] : 69 year old female with PMH of OA (s/p L hip replacement 2019, b/l TKA 2011), family history of CAD, former tobacco use (quit 40-50 years ago), and recent diagnosis of breast cancer with planned b/l mastectomy who presents for pre-operative cardiovascular risk assessment.  Patient is active without exertional symptoms, no history of chest pain, dyspnea, or palpitations.  BP is well controlled on current antihypertensive regimen. Prior lipid panel from 8/2024 showed  (current goal < 100mg/dL). 10 year ASCVD risk score is 8.2% intermediate risk. Patient continues intentional weight loss on Wegovy and would prefer continue dietary, lifestyle modifications and weight loss and would rather not start statin therapy at this time. Given excellent reported exertional capacity without functional limitations, well controlled HTN, no additional cardiovascular evaluation is required prior to patient's upcoming procedure. From a cardiovascular perspective, patient may proceed with relatively low-risk breast surgery on 11/8/2024.

## 2024-10-31 NOTE — HISTORY OF PRESENT ILLNESS
[FreeTextEntry1] : Patient is a 69 year old female with PMH of OA (s/p L hip replacement 2019, b/l TKA 2011), family history of CAD, former tobacco use (quit 40-50 years ago), and recent diagnosis of breast cancer with planned b/l mastectomy who presents for pre-operative cardiovascular risk assessment.  Patient has no acute complaints and states that she is very active, performs aerobic exercise multiple times per week without any exertional symptoms.  She denies any chest pain, dypsnea, palpitations, orthopnea, or lightheadedness. She reports compliance with her antihypertensive regimen of chlorthalidone and telmisartan. She reports intentional weight loss on Wegovy.  Prior Studies: Labs: Lipid Panel (8/2/2024): , HDL 61, , TG 73. A1c (10/30/24): 5.5%

## 2024-11-19 NOTE — HISTORY OF PRESENT ILLNESS
[FreeTextEntry1] : Referred by Dr. Tanvir Dhillon PCP Nik Cannon MD  Patient is a 69-year-old, Myriad negative, white female here today for postop visit s/p 11/8/24 bilateral mastectomy, R therapeutic and left prophylactic and right SLNBx with tissue expanders per Dr. Chaney for Right ILC x2 foci, 2:00 4N, 1:00 1N, ER+ 90%, CT+ 80%. Her2 negative (2 out of 3 abnormal lesions) 11/8/24 Surgical path: R mastectomy- ILC multiple foci largest 1.2cm? total of 5 foci. All margins negative, closest greater than 2mm. LCIS also present, classic and pleomorphic. 0/5LN negative. pT1cN0/5 Left mastectomy- benign breast tissue with fibrocystic changes including usual ductal hyperplasia and apocrine metaplasia. Benign nipple and areolar complex.  No hematoma, no cellulitis?  7/29/24 NFR, bilat sMMG: Scattered FG. Two spiculated masses with associated architectural distortion are seen in the central inner right breast. The more anterior mass at the 9:00 axis demonstrates associated calcifications. No suspicious mass, suspicious microcalcifications, or other sign of malignancy is identified in the left breast. Additional scattered benign type calcifications are noted. FERNANDO grade 0. Impression: Spiculated masses with associated architectural distortion in the inner right breast. The patient will be recalled for additional mmg views and targeted right breast US for further eval. BR0  9/17/24 NFR, R dMMG and limited US: Scattered FG. Three medial / approx 3:00 RIGHT breast lesions,  by up approx 5.5cm: Mid posterior/N8, 3:00 position: 10 mm spiculated mass. Mid/N5, 3:00 position: 4 mm spiculated mass or focal distortion. Anterior/N2 subareolar position: 6 mm spiculated mass R US: Irregular hypoechoic masses correspond to mammographic abnormalities above: 2-3:00 N4 position: 10 x 8mm hypoechoic mass. 1-2:00 N4 position: 8mm indistinct hypoechoic mass. 1-2:00 N1 position: 10 mm hypoechoic shadowing mass No suspicious RIGHT axillary lymphadenopathy. Impression: Three suspicious medial RIGHT breast masses. Ultrasound-guided biopsy is recommended of two outer perimeter masses or all three RIGHT breast masses in medial RIGHT breast. BR5  9/26/24 [facility], Right 2:00 4N US guided bx: Invasive lobular carcinoma, pleomorphic type, measures at least 9mm. Solid carcinoma in situ Gr2. Lymphovascular permeation by tumor not seen. Right 1:00 1N: Invasive lobular carcinoma similar to that in part 1, measures at least 10mm. Solid carcinoma in situ Gr2. No lymphovascular permeation by tumor is seen. Calcs present in benign breast tissue. ER+ 90%, CT+ 80%. Her2 negative. Concordant. Rec surgical or oncologic management  10/11/24 NFR, MRI breast: R- Tissue markers are identified in the subareolar right breast corresponding to territorial marker placed at biopsy representing invasive malignancy. This is located in the 12 to 1:00 position. More posterior in the medial aspect artifact is seen representing wing shape clip corresponding to biopsy-proven malignancy 2:00 position. Biopsy proven malignancy in the 12 to 1:00 position anteriorly measures approximately 7 x 6 mm. Biopsy-proven malignancy posteriorly in the 2:00 position measures 12 x 8 mm. These 2 biopsy-proven malignancies are  by a distance of 7.2 cm. In the upper inner quadrant 1 to 2:00 position is abnormal enhancement measuring up to 10 mm representing additional site of malignancy. Asymmetric suspicious enhancement is also noted in the lateral aspect upper outer quadrant measuring up to 2.5 cm. No additional sites of abnormal enhancement. L- No susp findings. Axilla/other: no susp axillary or internal mammary lymphadenopathy. Impression: Multifocal malignancy right breast with biopsy proven malignancies corresponding to tissue markers with measurements and positions as above. A third site of malignancy is seen medially. Abnormal 2.5 cm enhancement UOQ right breast. No evidence for contralateral disease. Rec: surgical or oncologic management. BR6.    Fhx: ?Abdominal Cancer: Father, 91 y/o. Pt lives in [place].  CBE: Right: Slight ecchymosis from bx x2 sites. Breast is nodular irineo at 1:00 3N nodular area, ? lesion vs post bx change. No discrete mass. Left negative. No axillary or SC lymphadenopathy.  Very long discussion with patient regarding the biopsy results from 9/26/24 from [facility] showing ILC of the R breast at the 1 o'clock, 1 cm FTN, Grade 2 , ER 90% , CT 80% and Wvt5uve neg , measuring 1cm. A second site also with ILC at 2:00 4N. The extremes were biopsied but there was a third abnormal lesion. Reviewed extensively the options of mastectomy vs lumpectomy with the pros and cons for both. With mastectomy, options of immediate or delayed reconstruction (expander/implant vs autologous flap) discussed and reconstruction handout given and contact for plastic surgeon. Also discussed usually no radiation with mastectomy but pending final pathology. Discussed usually no reexcision with mastectomy. Reviewed will need drain(s) with mastectomy. With lumpectomy, will need radiation and treatment was reviewed. Would need localization. Also discussed will need negative margins and if too close or positive, may need reexcision(s). If unable to achieve negative margins with reexcision(s), she may need mastectomy. Padmini may be an oncoplastic surgery candidate since lesions are in UIQ however the multifocal nature of her disease may be too extensive for lumpectomy. Rec MRI for further evaluation.  Reviewed SLNBx (dye and radioactive tracer). With mastectomy if SLN is positive on permanent, may need delay ALND or postmastectomy radiation. With lumpectomy, given Z-11, no ALND with positive LN as long as <4 since adjuvant radiation is to be given. If SLN are clinically suspicious, may check frozen and pending results, may proceed to ALND. Highly unlikely given negative on clinical exam and on studies. Discussed risks and complications including lymphedema and handout given out. Discussed probably will need hormonal therapy. [id number] was neg on one of the tumors but may need receptors on the other tumors to determine need of herceptin. Need for chemotherapy pending results of final pathology. May need additional tumor analysis such as oncotype Dx to determine need for chemotherapy. Discussed survival with BCT vs mastectomy is the same and recurrence risk with mastectomy is not 0%.  Rec genetic testing and SOZO. Pt is clinical stage 1. She is leaning towards Mastectomy and prophylactic on the left. Discussed no evidence of increased survival with prophlyactic mastectomy. Pt wondering about NSM, one of the lesions is 1 Cm ftn and discussed would check SA tissue and if positive, may need delayed removal of NAC. ALso would be cosmetic only and could also have NAC recon with tattooing as another option. Reviewed with patient her exercise regimen. She does exercise regularly. Discussed exercise oncology and the importance of regular exercise, 300min per week, including cardiac and resistance training to overall health and also reduction of 20-30% for recurrence. Discussed virtual exercise programs, Tap 'n Tap. Contact given for Dr. Efren Calloway for activity and further exercise oncology review. Pt used to smoke as a teenager and now Vapes, rec cessation. Also rec discontinue prempro, she has been on it for 20 years.  PLAN: MRI Genetic testing and SOZO at [place] Appt with Dr Chaney, leaning towards bilat mastectomy and possible VICENTE. Stop Prempro.

## 2024-11-25 NOTE — ASSESSMENT
[FreeTextEntry1] : 68 y/o female for follow up   Healing well No evidence of infection.  Continued IV antibiotics at home as instructed by ID. Follow up in one week for fills of TE. Plan and patient status as per Dr Chaney.  All questions and concerns addressed.  The weight of the right mastectomy tissue was 445 gms The weight of the left mastectomy tissue was 484 gms The TE are 550cc with 250cc bilateral fills intraoperatively.

## 2024-11-25 NOTE — HISTORY OF PRESENT ILLNESS
[FreeTextEntry1] : Referred by Dr. Tanvir Dhillon PCP Nik Cannon MD  Patient is a 69-year-old, Myriad negative, white female here today for postop visit s/p 11/8/24 bilateral mastectomy, R therapeutic and left prophylactic and right SLNBx with tissue expanders per Dr. Chaney for Right ILC x2 foci, 2:00 4N, 1:00 1N, ER+ 90%, RI+ 80%. Her2 negative (2 out of 3 abnormal lesions).  11/8/24 Surgical path: R mastectomy- ILC multiple foci largest 1.2cm? total of 5 foci. All margins negative, closest greater than 2mm. LCIS also present, classic and pleomorphic. 0/5LN negative. pT1cN0/5 Left mastectomy- benign breast tissue with fibrocystic changes including usual ductal hyperplasia and apocrine metaplasia. Benign nipple and areolar complex.  Pt. had to get left implant removed due to infection, with , was washed and new TE was placed. Drain removed today by Dr. Chaney. Had PICC line inserted for IV antibiotics. To f/u with ID for completion of therapy.  No hematoma, no cellulitis.  She is leaving for [place] in a few weeks and needs to see Hem/Onc prior to leaving. Oncotype pending.   Fhx: ?Abdominal Cancer: Father, 93 y/o. Pt lives in [place] and [place] for half the year as well.

## 2024-11-25 NOTE — ASSESSMENT
[FreeTextEntry1] : 70 y/o female for follow up   Healing well No evidence of infection.  Continued IV antibiotics at home as instructed by ID. Follow up in one week for fills of TE. Plan and patient status as per Dr Chaney.  All questions and concerns addressed.  The weight of the right mastectomy tissue was 445 gms The weight of the left mastectomy tissue was 484 gms The TE are 550cc with 250cc bilateral fills intraoperatively.

## 2024-11-25 NOTE — HISTORY OF PRESENT ILLNESS
[FreeTextEntry1] : Patient presents for follow up and states that she is doing well and has been taking her antibiotics as prescribed.  She states that she has been recording the HALIMA drain output and is hoping that both drains can be removed today.  She denies any redness, fever, chills, or pain.  No complaints.

## 2024-11-25 NOTE — PHYSICAL EXAM
[No Edema] : no edema [No Rashes] : no rashes [No Ulceration] : no ulceration [No Swelling] : no swelling [Full ROM] : full range of motion [de-identified] : Bilat mastectomy with expanders. No evidence of cellulitis or hematoma. Full ROM and no lymphedema.  [TextEntry] : Neuro: Gait normal, alert & oriented x3. Pysch: Grossly intact, speech clear and fluent, mood appropriate.

## 2024-11-25 NOTE — ASSESSMENT
[FreeTextEntry1] : Referred by Dr. Tanvir Dhillon PCP Nik Cannon MD  Patient is a 69-year-old, Myriad negative, white female here today for postop visit s/p 11/8/24 bilateral mastectomy, R therapeutic and left prophylactic and right SLNBx with tissue expanders per Dr. Chaney for Right ILC x2 foci, 2:00 4N, 1:00 1N, ER+ 90%, [place]+ 80%. Her2 negative (2 out of 3 abnormal lesions).  11/8/24 Surgical path: R mastectomy- ILC multiple foci largest 1.2cm? total of 5 foci. All margins negative, closest greater than 2mm. LCIS also present, classic and pleomorphic. 0/5LN negative. pT1cN0/5 Left mastectomy- benign breast tissue with fibrocystic changes including usual ductal hyperplasia and apocrine metaplasia. Benign nipple and areolar complex.  Pt. had to get left implant removed due to infection, with , was washed and new TE was placed. Drain removed today by Dr. Chaney. Had PICC line inserted for IV antibiotics. To f/u with ID for completion of therapy.  No hematoma, no cellulitis.  [place] is leaving for [place] in a few weeks and needs to see Hem/Onc prior to leaving. Oncotype pending.   Fhx: ?Abdominal Cancer: Father, 91 y/o. Pt lives in [place] and [place] for half the year as well.   CBE: Bilat mastectomy with expanders. No evidence of cellulitis or hematoma. Full ROM and no lymphedema.   PLAN: Await oncotype Appt with Dr. Frey. F.u with Diann Chaney, Niko as scheduled. F.u with me on return back from [place].  KIRTI next visit.

## 2024-11-25 NOTE — PHYSICAL EXAM
[No Edema] : no edema [No Rashes] : no rashes [No Ulceration] : no ulceration [No Swelling] : no swelling [Full ROM] : full range of motion [de-identified] : Bilat mastectomy with expanders. No evidence of cellulitis or hematoma. Full ROM and no lymphedema.  [TextEntry] : Neuro: Gait normal, alert & oriented x3. Pysch: Grossly intact, speech clear and fluent, mood appropriate.

## 2024-11-25 NOTE — PAST MEDICAL HISTORY
[Menarche Age ____] : age at menarche was [unfilled] [Menopause Age____] : age at menopause was [unfilled] [History of Hormone Replacement Treatment] : has a history of hormone replacement treatment [Approximately ___] : the LMP was approximately [unfilled] [Total Preg ___] : G[unfilled] [Live Births ___] : P[unfilled]  [Living ___] : Living: [unfilled] [Age At Live Birth ___] : Age at live birth: [unfilled] [FreeTextEntry8] : Yes [FreeTextEntry7] : Yes, 4 months

## 2024-11-25 NOTE — PHYSICAL EXAM
[No Edema] : no edema [No Rashes] : no rashes [No Ulceration] : no ulceration [No Swelling] : no swelling [Full ROM] : full range of motion [de-identified] : Bilat mastectomy with expanders. No evidence of cellulitis or hematoma. Full ROM and no lymphedema.  [TextEntry] : Neuro: Gait normal, alert & oriented x3. Pysch: Grossly intact, speech clear and fluent, mood appropriate.

## 2024-11-25 NOTE — PHYSICAL EXAM
[NI] : Normal [de-identified] : TE are in place and symmetric. Bilateral HALIMA drains are removed today without difficulty. All remaining steri strips are removed.  No erythema, edema, or evidence of infection of skin overlying TE.  [de-identified] : No breasts bilaterally, s/p bilateral mastectomy.

## 2024-11-25 NOTE — ASSESSMENT
[FreeTextEntry1] : Referred by Dr. Tanvir Dhillon PCP Nik Cannon MD  Patient is a 69-year-old, Myriad negative, white female here today for postop visit s/p 11/8/24 bilateral mastectomy, R therapeutic and left prophylactic and right SLNBx with tissue expanders per Dr. Chaney for Right ILC x2 foci, 2:00 4N, 1:00 1N, ER+ 90%, MA+ 80%. Her2 negative (2 out of 3 abnormal lesions).  11/8/24 Surgical path: R mastectomy- ILC multiple foci largest 1.2cm? total of 5 foci. All margins negative, closest greater than 2mm. LCIS also present, classic and pleomorphic. 0/5LN negative. pT1cN0/5 Left mastectomy- benign breast tissue with fibrocystic changes including usual ductal hyperplasia and apocrine metaplasia. Benign nipple and areolar complex.  Pt. had to get left implant removed due to infection, with , was washed and new TE was placed. Drain removed today by Dr. Chaney. Had PICC line inserted for IV antibiotics. To f/u with ID for completion of therapy.  No hematoma, no cellulitis.  She is leaving for [place] in a few weeks and needs to see Hem/Onc prior to leaving. Oncotype pending.   Fhx: ?Abdominal Cancer: Father, 93 y/o. Pt lives in [place] and [place] for half the year as well.   CBE: Bilat mastectomy with expanders. No evidence of cellulitis or hematoma. Full ROM and no lymphedema.   PLAN: Await oncotype Appt with Dr. Frey. F.u with Diann Chaney, Niko as scheduled. F.u with me on return back from [place].  KIRTI next visit.

## 2024-11-25 NOTE — HISTORY OF PRESENT ILLNESS
[FreeTextEntry1] : Referred by Dr. Tanvir Dhillon PCP Nik aCnnon MD  Patient is a 69-year-old, Myriad negative, white female here today for postop visit s/p 11/8/24 bilateral mastectomy, R therapeutic and left prophylactic and right SLNBx with tissue expanders per Dr. Chaney for Right ILC x2 foci, 2:00 4N, 1:00 1N, ER+ 90%, [place]+ 80%. Her2 negative (2 out of 3 abnormal lesions).  11/8/24 Surgical path: R mastectomy- ILC multiple foci largest 1.2cm? total of 5 foci. All margins negative, closest greater than 2mm. LCIS also present, classic and pleomorphic. 0/5LN negative. pT1cN0/5 Left mastectomy- benign breast tissue with fibrocystic changes including usual ductal hyperplasia and apocrine metaplasia. Benign nipple and areolar complex.  Pt. had to get left implant removed due to infection, with , was washed and new TE was placed. Drain removed today by Dr. Chaney. Had PICC line inserted for IV antibiotics. To f/u with ID for completion of therapy.  No hematoma, no cellulitis.  She is leaving for [place] in a few weeks and needs to see Hem/Onc prior to leaving. Oncotype pending.   Fhx: ?Abdominal Cancer: Father, 93 y/o. Pt lives in [place] and [place] for half the year as well.

## 2024-11-25 NOTE — DATA REVIEWED
[FreeTextEntry1] : 11/8/24 Surgical path: R mastectomy- ILC multiple foci largest 1.2cm? total of 5 foci. All margins negative, closest greater than 2mm. LCIS also present, classic and pleomorphic. 0/5LN negative. pT1cN0/5 Left mastectomy- benign breast tissue with fibrocystic changes including usual ductal hyperplasia and apocrine metaplasia. Benign nipple and areolar complex.

## 2024-11-25 NOTE — ASSESSMENT
[FreeTextEntry1] : Referred by Dr. Tanvir Dhillon PCP Nik Cannon MD  Patient is a 69-year-old, Myriad negative, white female here today for postop visit s/p 11/8/24 bilateral mastectomy, R therapeutic and left prophylactic and right SLNBx with tissue expanders per Dr. Chaney for Right ILC x2 foci, 2:00 4N, 1:00 1N, ER+ 90%, [place]+ 80%. Her2 negative (2 out of 3 abnormal lesions).  11/8/24 Surgical path: R mastectomy- ILC multiple foci largest 1.2cm? total of 5 foci. All margins negative, closest greater than 2mm. LCIS also present, classic and pleomorphic. 0/5LN negative. pT1cN0/5 Left mastectomy- benign breast tissue with fibrocystic changes including usual ductal hyperplasia and apocrine metaplasia. Benign nipple and areolar complex.  Pt. had to get left implant removed due to infection, with , was washed and new TE was placed. Drain removed today by Dr. Chaney. Had PICC line inserted for IV antibiotics. To f/u with ID for completion of therapy.  No hematoma, no cellulitis.  She is leaving for [place] in a few weeks and needs to see Hem/Onc prior to leaving. Oncotype pending.   Fhx: ?Abdominal Cancer: Father, 91 y/o. Pt lives in [place] and [place] for half the year as well.   CBE: Bilat mastectomy with expanders. No evidence of cellulitis or hematoma. Full ROM and no lymphedema.   PLAN: Await oncotype Appt with Dr. Frey. F.u with Diann Chaney, Niko as scheduled. F.u with me on return back from [place].  KIRTI next visit.

## 2024-11-25 NOTE — CONSULT LETTER
[Dear  ___] : Dear  [unfilled], [Courtesy Letter:] : I had the pleasure of seeing your patient, [unfilled], in my office today. [Referral Closing:] : Thank you very much for seeing this patient.  If you have any questions, please do not hesitate to contact me. [Sincerely,] : Sincerely, [FreeTextEntry3] : Denisse Donaldson MD

## 2024-11-25 NOTE — HISTORY OF PRESENT ILLNESS
[FreeTextEntry1] : Referred by Dr. Tanvir Dhillon PCP Nik Cannon MD  Patient is a 69-year-old, Myriad negative, white female here today for postop visit s/p 11/8/24 bilateral mastectomy, R therapeutic and left prophylactic and right SLNBx with tissue expanders per Dr. Chaney for Right ILC x2 foci, 2:00 4N, 1:00 1N, ER+ 90%, [place]+ 80%. Her2 negative (2 out of 3 abnormal lesions).  11/8/24 Surgical path: R mastectomy- ILC multiple foci largest 1.2cm? total of 5 foci. All margins negative, closest greater than 2mm. LCIS also present, classic and pleomorphic. 0/5LN negative. pT1cN0/5 Left mastectomy- benign breast tissue with fibrocystic changes including usual ductal hyperplasia and apocrine metaplasia. Benign nipple and areolar complex.  Pt. had to get left implant removed due to infection, with , was washed and new TE was placed. Drain removed today by Dr. Chaney. Had PICC line inserted for IV antibiotics. To f/u with ID for completion of therapy.  No hematoma, no cellulitis.  She is leaving for [place] in a few weeks and needs to see Hem/Onc prior to leaving. Oncotype pending.   Fhx: ?Abdominal Cancer: Father, 91 y/o. Pt lives in [place] and [place] for half the year as well.

## 2024-11-25 NOTE — ADDENDUM
[FreeTextEntry1] : All medical record entries were at my direction and personally dictated by me (Dr. Kaleigh Chaney). I have reviewed the chart and agree that the record accurately reflects my personal performance of the history, physical exam, assessment and plan.

## 2024-11-25 NOTE — PHYSICAL EXAM
[NI] : Normal [de-identified] : TE are in place and symmetric. Bilateral HALIMA drains are removed today without difficulty. All remaining steri strips are removed.  No erythema, edema, or evidence of infection of skin overlying TE.  [de-identified] : No breasts bilaterally, s/p bilateral mastectomy.

## 2024-12-04 NOTE — ADDENDUM
[FreeTextEntry1] : All medical record entries were at my direction and personally dictated by me (Dr. Kaleigh Chaney). I have reviewed the chart and agree that the record accurately reflects my personal performance of the history, physical exam, assessment and plan. I let pt know that we may not be able to achieve the size she is looking for with just one more fill, and that the highest risk for recurrent infection will be in the period about 2 weeks after the antibiotics have stopped. I also encouraged her not to get on a plane back to [place] from [place] if she is sick as she could become septic with that delay. I would prefer if she would not travel right now, but she is insistent that she will be fine. I let her know the signs and symptoms to watch for and asked her to let me know if anything changes. She is due to follow up with me next week before she leaves.

## 2024-12-04 NOTE — ASSESSMENT
[FreeTextEntry1] : This is a 69 y.o. F Recent diagnosis of right-sided breast cancer, who underwent a bilateral mastectomy on 11/8/2024 with placement of bilateral tissue expanders.  Perioperatively, patient was given vancomycin, and Zosyn. 11/14 3 specimens from the operating room were sent. all cx negative remains negative with organisms seen .  - in an abundance of caution, to reduce chance of infection of this tissue expander, we initially agreed to circa 4 weeks of antibiotics therapy.  She now has pulled out her PICC line.  At the current time, we will de-escalate her to Augmentin 875 mg twice a day.  She can follow-up with Dr. Beach regarding the cultures collected recently.  She expressed her intention is to go back to [facility] the [facility] in the upcoming weeks.  If she runs into any infectious disease concerns, I have advised her to present to the local hospital there where she can get IV antibiotics.  She agrees.

## 2024-12-04 NOTE — ASSESSMENT
[FreeTextEntry1] : This is a 69 y.o. F Recent diagnosis of right-sided breast cancer, who underwent a bilateral mastectomy on 11/8/2024 with placement of bilateral tissue expanders.  Perioperatively, patient was given vancomycin, and Zosyn. 11/14 3 specimens from the operating room were sent. all cx negative remains negative with organisms seen .  - in an abundance of caution, to reduce chance of infection of this tissue expander, we initially agreed to circa 4 weeks of antibiotics therapy.  She now has pulled out her PICC line.  At the current time, we will de-escalate her to Augmentin 875 mg twice a day.  She can follow-up with Dr. Beach regarding the cultures collected recently.  She expressed her intention is to go back to [facility] the [facility] in the upcoming weeks.  If she runs into any infectious disease concerns, I have advised her to present to the local hospital there where she can get IV antibiotics.  She agrees.

## 2024-12-04 NOTE — REASON FOR VISIT
[Follow-Up: _____] : a [unfilled] follow-up visit [FreeTextEntry1] : Patient is here for follow up. Patient states last night at 1:00 AM she ripped her PICC line out by accident, notified nursing and they told her to put a bandaid on it with bacitracin, has follow up with her infectious disease doctor today 12/4/24. Oher than that patient states her breasts area has no more redness and she feels great.

## 2024-12-04 NOTE — HISTORY OF PRESENT ILLNESS
[FreeTextEntry1] : Patient presents for follow up and states that she thinks her left reconstructed breast is much less red than before.  She also states that she had taken out her Medline while she was sleeping as it was really itchy.  She states that she is going to see the ID doctor today and there will be a decision to either replace the line, or switch to oral antibiotics.  She denies any pain, fever, chills, H/A.  No other complaints today.

## 2024-12-04 NOTE — ASSESSMENT
[FreeTextEntry1] : 68 y/o female for follow up  Plan is for follow up in one week for another fill. Patient is to have visit with ID today to discuss continued antibiotic use. All questions and concerns addressed, including the need for follow up after she travels to [place].  She is urged to find the best hospital near her in [place] just in case she does develop an infection in the future or need urgent care while out of the country.   Plan and patient status as per Dr Chaney.     The weight of the right mastectomy tissue was 445 gms The weight of the left mastectomy tissue was 484 gms The TE are 550cc with 250cc bilateral fills intraoperatively.  12/4/24 60cc fills bilaterally for 310cc total.

## 2024-12-04 NOTE — PHYSICAL EXAM
[General Appearance - Alert] : alert [General Appearance - In No Acute Distress] : in no acute distress [Sclera] : the sclera and conjunctiva were normal [PERRL With Normal Accommodation] : pupils were equal in size, round, reactive to light [Extraocular Movements] : extraocular movements were intact [Outer Ear] : the ears and nose were normal in appearance [Oropharynx] : the oropharynx was normal with no thrush [Neck Appearance] : the appearance of the neck was normal [Neck Cervical Mass (___cm)] : no neck mass was observed [Jugular Venous Distention Increased] : there was no jugular-venous distention [Thyroid Diffuse Enlargement] : the thyroid was not enlarged [Auscultation Breath Sounds / Voice Sounds] : lungs were clear to auscultation bilaterally [Heart Rate And Rhythm] : heart rate was normal and rhythm regular [Heart Sounds] : normal S1 and S2 [Heart Sounds Gallop] : no gallops [Murmurs] : no murmurs [Heart Sounds Pericardial Friction Rub] : no pericardial rub [Full Pulse] : the pedal pulses are present [Edema] : there was no peripheral edema [Bowel Sounds] : normal bowel sounds [Abdomen Soft] : soft [Abdomen Tenderness] : non-tender [Abdomen Mass (___ Cm)] : no abdominal mass palpated [Costovertebral Angle Tenderness] : no CVA tenderness [No Palpable Adenopathy] : no palpable adenopathy [Musculoskeletal - Swelling] : no joint swelling [Nail Clubbing] : no clubbing  or cyanosis of the fingernails [Motor Tone] : muscle strength and tone were normal [Skin Color & Pigmentation] : normal skin color and pigmentation [] : no rash [FreeTextEntry1] : breast exam Chaperone by Greta Morgan; MA - absence of bilateral nipples. anchor incisions bilaterally.  The lower half of the left breast has some increase sheen to the skin, with slightly more erythema.  Induration noted bilaterally. [Cranial Nerves] : cranial nerves 2-12 were intact [Sensation] : the sensory exam was normal to light touch and pinprick [No Focal Deficits] : no focal deficits [Oriented To Time, Place, And Person] : oriented to person, place, and time [Affect] : the affect was normal

## 2024-12-04 NOTE — HISTORY OF PRESENT ILLNESS
[FreeTextEntry1] : This is a 69 y.o. F Recent diagnosis of right-sided breast cancer, who underwent a bilateral mastectomy on 11/8/2024 with placement of bilateral tissue expanders.  In follow-up visit at the outpatient office with her surgical team, on 11/14/2024, there was noted to be redness and induration around the bottom of the left breast. She had been dressing the incisions as per the surgical team's request. There was concern for infection of the implanted device, she went to the operating room on 11/14/2024. From the operative report, there was no significant shawnee-implant fluid. There is no purulence and there was a thin slime layer on top of the implant and in some areas on top of the pectoralis muscle.  Perioperatively, patient was given vancomycin, and Zosyn. 11/14 3 specimens from the operating room were sent.  all cx negative remains negative with organisms seen.  WBC is in normal range She had been on Zosyn and Vanco She was changed to Cefazolin 2 grams Q8H given negative cultures.  - in an abundance of caution, to reduce chance of infection of this tissue expander,  we agreed to circa 4 weeks of antiboitics therapy.  Cefazolin 2 grams Q8H thru 12/13/24- the PICC line placed on 11/18/24 can be removed at that time. Patient was last seen on 11/19/2024.  She is here for follow-up today on 12//2024.  She reports that earlier this morning she was itchy, ended up scratching her arm, and removing the PICC line from her arm. Is not some tenderness underneath the left breast, it still open more swollen.  Dr. Chaney took some fluid out from the area to be sent to cultures.

## 2024-12-04 NOTE — PHYSICAL EXAM
[NI] : Normal [de-identified] : TE are in place and symmetric. Left reconstructed breast continues to look pink in the lower dependent areas. No warmth, no erythema, no oozing and no evidence of infection.   After alcohol prep, magnet localization and CHG prep, 60ml fill performed without difficulty. Pt tolerated well. [de-identified] : No breasts bilaterally, s/p bilateral mastectomy.

## 2024-12-12 NOTE — PHYSICAL EXAM
[NI] : Normal [de-identified] : TE are in place and symmetric. Left reconstructed breast continues to look pink in the lower dependent areas. No warmth, no erythema, no oozing and no evidence of infection.  There is some palpable fluid above the left TE, but no aspiration today.    [de-identified] : No breasts bilaterally, s/p bilateral mastectomy.

## 2024-12-12 NOTE — REASON FOR VISIT
[Follow-Up: _____] : a [unfilled] follow-up visit [FreeTextEntry1] : Patient states she has been having some nausea and dizziness and thinks it is vertigo, states this started about 6 days ago. Patient states she used anti-nausea patches and it has helped, no other concerns.

## 2024-12-12 NOTE — PHYSICAL EXAM
[NI] : Normal [de-identified] : TE are in place and symmetric. Left reconstructed breast continues to look pink in the lower dependent areas. No warmth, no erythema, no oozing and no evidence of infection.  There is some palpable fluid above the left TE, but no aspiration today.    [de-identified] : No breasts bilaterally, s/p bilateral mastectomy.

## 2024-12-12 NOTE — HISTORY OF PRESENT ILLNESS
[FreeTextEntry1] : Patient presents for follow up and states that she had a few episodes of vertigo after taking the Augmentin.  She stopped the medication and used her scopolamine patches and it went away.  She was also started on a 4-week course of Cefdinir and is not complaining of any side effects thus far.  She states that while she has been strong, she is very tired and tearful and is looking forward to getting to [place] this Saturday.  She also has gotten in touch with her brother who lives in [place] and should she need medical care while away, she will take a flight to [place] where there is better care.  She denies any redness, rash, discomfort, fever or chills.  No complaints today.

## 2024-12-12 NOTE — HISTORY OF PRESENT ILLNESS
[FreeTextEntry1] : Patient presents for follow up and states that she had a few episodes of vertigo after taking the Augmentin.  She stopped the medication and used her scopolamine patches and it went away.  She was also started on a 4-week course of Cefdinir and is not complaining of any side effects thus far.  She states that while she has been strong, she is very tired and tearful and is looking forward to getting to [place] this Saturday.  She also has gotten in touch with her brother who lives in [place] and should she need medical care while away, she will take a flight to [place] where there is better care.  She denies any redness, rash, discomfort, fever or chills.  No complaints today.

## 2024-12-12 NOTE — ASSESSMENT
[FreeTextEntry1] : 70 y/o female for follow up   Patient is to continue PO antibiotics while away in [place] as per Dr Pope. All questions and concerns addressed including swimming and exercise, and planning should she experience any signs or symptoms of infection while in another country. Patient is to follow up in February and then in April.  Will discuss planning for delayed VICENTE procedure at that time.  Plan and patient status as per Dr Chaney.

## 2024-12-12 NOTE — ADDENDUM
[FreeTextEntry1] : All medical record entries were at my direction and personally dictated by me (Dr. Kaleigh Chaney). I have reviewed the chart and agree that the record accurately reflects my personal performance of the history, physical exam, assessment and plan. Pt is aware that the left implant infection could resurface or recur at any time, but especially in the first couple of weeks after she is off of the antibiotics. She is also aware that sepsis could be a possibility in that scenario and that the implants may need to come out. She still wishes to travel to [place] despite this. She has developed a backup plan of going to [place] instead and staying with her brother if she needs medical care while she is away. She will follow up here in person in February and call before then if she is having any problems.

## 2024-12-12 NOTE — ASSESSMENT
[FreeTextEntry1] : 70 y/o female for follow up   Patient is to continue PO antibiotics while away in [place] as per Dr Pope. All questions and concerns addressed including swimming and exercise, and planning should she experience any signs or symptoms of infection while in another country. Patient is to follow up in February and then in April.  Will discuss planning for delayed VICENTE procedure at that time.  Plan and patient status as per Dr Cahney.

## 2024-12-12 NOTE — ADDENDUM
[FreeTextEntry1] : All medical record entries were at my direction and personally dictated by me (Dr. Kaleigh Chaney). I have reviewed the chart and agree that the record accurately reflects my personal performance of the history, physical exam, assessment and plan. Pt is aware that the left implant infection could resurface or recur at any time, but especially in the first couple of weeks after she is off of the antibiotics. She is also aware that sepsis could be a possibility in that scenario and that the implants may need to come out. She still wishes to travel to [place] despite this. She has developed a backup plan of going to [place] instead and staying with her brother if she needs medical care while she is away. She will follow up here in person in February and call before then if she is having any problems.

## 2025-02-04 NOTE — ASSESSMENT
[FreeTextEntry1] : Referred by Dr. Tanvir Dhillon PCP Nik Cannon MD  Patient is a 69-year-old, Myriad negative, white female here today for postop visit s/p 11/8/24 bilateral mastectomy, *Right therapeutic and Left prophylactic and right SLNBx with tissue expanders per Dr. Chaney for Right ILC x2 foci, 2:00 4N, 1:00 1N, ER+ 90%, MS+ 80%. Her2 negative (2 out of 3 abnormal lesions). 11/8/24 Surgical path: R mastectomy- ILC multiple foci largest 1.2cm? total of 5 foci. All margins negative, closest greater than 2mm. LCIS also present, classic and pleomorphic. 0/5LN negative. pT1cN0/5 Left mastectomy- benign breast tissue with fibrocystic changes including usual ductal hyperplasia and apocrine metaplasia. Benign nipple and areolar complex.  Recently returned from [place] and going back on tuesday.  Pt. had to get left implant removed due to infection with , was washed and new TE was placed. Had PICC line for IV antibiotics, followed with ID Dr. Pope, although she had pulled out her PICC incidentally. Completed Augmentin PO course and now no evidence of infection.  She saw Hem/Onc Dr. Frey, not sure about the AI, she was rec for it but she wants to think about it.  Initial Oncotype on primary (12mm foci) resulted at 16; Additional 10mm and 9mm foci receptor status was ER+ (%)/ [place] NEGATIVE/ HER2 negative. Due to the change in [place]-, additional Oncotypes sent.  Resulted at 22 and 18. Absolute CT benefit <1%  Fhx: ?Abdominal Cancer: Father, 91 y/o. Pt lives in [place] and [place] for half the year as well.  CBE: Bilat mastectomy with expanders. No evidence of cellulitis or hematoma. Full ROM and no lymphedema. Rec AI for pt, she does not need chemo.  She does not want to start AI, wants to think about it. She is going to [place] on Tuesday and will decide upon her return in 4 mos.  She is also planning on shoulder surgery on the right in August.  PLAN: Appt with Dr. Frey. F.u with Niko Morrissey as scheduled. F.u with me 6 mos.  SOZO done today.

## 2025-02-04 NOTE — PHYSICAL EXAM
[de-identified] : CBE: Bilat mastectomy with expanders. No evidence of cellulitis or hematoma. Full ROM and no lymphedema.

## 2025-02-04 NOTE — RESULTS/DATA
[FreeTextEntry1] : #Right breast cancer- ILC multifocal, ER/CA+, stage IA  Padmini underwent screening breast imaging in July 2024 which revealed multiple masses in the right breast, confirmed on diagnostic imaging.  R breast biopsy x 2 was performed on 9/27/24 and revealed invasive lobular carcinoma at both sites, measuring 9mm and 10mm, ER+ 90%, CA+ 80%, Her2 negative (1+). Breast MRI was performed and showed multifocal breast malignancy, no evidence of contralateral disease.  She ultimately underwent bilateral mastectomy on 11/8/24 with Dr. Denisse Donaldson- the left breast was benign and the right breast was notable for ILC and LCIS, mixed classic and pleomorphic types, LN 0/5, there were multiple foci of invasive carcinoma measuring 12mm, 10mm, 9mm, 2mm and 1.2mm, final staging, pT1c,N0.  We discussed the excellent prognosis of stage I, ER positive, CA positive, node negative breast cancer. We explained that recent data suggests that chemotherapy may be spared for many patients with this type of breast cancer (up to 85%). We discussed the use of Oncotype DX genomic profile to determine the risk of recurrence and benefit from chemotherapy based on the results of the Tailor Rx Study to better determine which patients should receive chemotherapy in addition to hormonal therapy.  Her oncotype scores resulted as 16, 22 and 18 for the 3 largest foci respectively. No role for chemotherapy at this time.  Referred to medical oncology to discuss antiestrogen therapy. We discussed she is a candidate for antiestrogen therapy - discussed options in detail including arimidex vs. tamoxifen. She does not feel comfortable starting anti-estrogen therapy at this time. Would like to re-discuss after her breast reconstruction in April 2025.  Genetics: [facility] 10/15/24- no pathogenic mutations or VUS  RT June 2025 to further discuss antiestrogen therapy. She understands increased risk of recurrence (potentially distant recurrence) without antiestrogen therapy.  All patient questions answered at today's visit. Patient urged to call the office with any questions or concerns.  I personally have spent a total of 75 minutes of time on the date of this encounter reviewing test results, documenting findings, coordinating care and directly consulting with the patient and/or designated family member. Greater than 50% of the face to face encounter time was spent on counseling and/or coordination of care for right breast cancer.

## 2025-02-04 NOTE — ASSESSMENT
[FreeTextEntry1] : Referred by Dr. Tanvir Dhillon PCP Nik Cannon MD  Patient is a 69-year-old, Myriad negative, white female here today for postop visit s/p 11/8/24 bilateral mastectomy, *Right therapeutic and Left prophylactic and right SLNBx with tissue expanders per Dr. Chaney for Right ILC x2 foci, 2:00 4N, 1:00 1N, ER+ 90%, [place]V+ 80%. Her2 negative (2 out of 3 abnormal lesions). 11/8/24 Surgical path: R mastectomy- ILC multiple foci largest 1.2cm? total of 5 foci. All margins negative, closest greater than 2mm. LCIS also present, classic and pleomorphic. 0/5LN negative. pT1cN0/5 Left mastectomy- benign breast tissue with fibrocystic changes including usual ductal hyperplasia and apocrine metaplasia. Benign nipple and areolar complex.  Recently returned from [place] and going back on tuesday.  Pt. had to get left implant removed due to infection with , was washed and new TE was placed. Had PICC line for IV antibiotics, followed with ID Dr. Pope, although she had pulled out her PICC incidentally. Completed Augmentin PO course and now no evidence of infection.  She saw Hem/Onc Dr. Frey, not sure about the AI, she was rec for it but she wants to think about it.  Initial Oncotype on primary (12mm foci) resulted at 16; Additional 10mm and 9mm foci receptor status was ER+ (%)/ [place] NEGATIVE/ HER2 negative. Due to the change in [place]-, additional Oncotypes sent.  Resulted at 22 and 18. Absolute CT benefit <1%  Fhx: ?Abdominal Cancer: Father, 93 y/o. Pt lives in [place] and [place] for half the year as well.  CBE: Bilat mastectomy with expanders. No evidence of cellulitis or hematoma. Full ROM and no lymphedema. Rec AI for pt, she does not need chemo.  She does not want to start AI, wants to think about it. She is going to [place] on Tuesday and will decide upon her return in 4 mos.  She is also planning on shoulder surgery on the right in August.  PLAN: Appt with Dr. Frey. F.u with Niko Morrissey as scheduled. F.u with me 6 mos.  SOZO done today.

## 2025-02-04 NOTE — HISTORY OF PRESENT ILLNESS
[de-identified] : Referred by: Dr. Denisse Donaldson PCP: Dr. Nik Boyle  Diagnosis: Right breast cancer   - Fito Jack -  [phone] Daughter - Guero Reed - [phone]   Padmini Reed is a post-menopausal female who presented at age 69 in 2025 for evaluation of right breast cancer.  The patient has a medical history of HTN, HLD (improved), hay fever and overweight (on Wegovy x 8 hrs), left breast wound infection.  Planned for tissue reconstruction in 2025.  Surgical hx:  bilateral knee replacements, left hip replacement,  delivery (), bilateral mastectomies w/ TE's (2024).   Padmini underwent screening breast imaging in 2024 which revealed multiple masses in the right breast, confirmed on diagnostic imaging. She denied any breast complaints at that time including breast mass, nipple discharge or breast pain. R breast biopsy x 2 was performed on 24 and revealed invasive lobular carcinoma at both sites, measuring 9mm and 10mm, ER+ 90%, CA+ 80%, Her2 negative (1+). Breast MRI was performed and showed multifocal breast malignancy, no evidence of contralateral disease.  She ultimately underwent bilateral mastectomy on 24 with Dr. Denisse Donaldson- the left breast was benign and the right breast was notable for ILC and LCIS, mixed classic and pleomorphic types, LN 0/5, there were multiple foci of invasive carcinoma measuring 12mm, 10mm, 9mm, 2mm and 1.2mm, final staging, pT1c,N0.   Oncotype score (from largest foci) resulted at 16. 4% distant recurrence at 9 years, <1% CT benefit. Reviewed with Dr. Donaldson, no need for chemo based on this result. Additional Oncotypes done on 10mm and 9mm focis of tumor resulted at: 22; 8% distant recurrence risk at 9 years and <1 % average absolute CT benefit and 18; 5% distant recurrence risk at 9 years and <1 % average absolute CT benefit. Referred to medical oncology to discuss antiestrogen therapy.   At today's visit she is generally feeling well. Pending flap reconstruction in April. Previously took hormone replacement therapy which she stopped upon diagnosis. Has lost 25lb on Wegovy. Spends the [place] in [place]. She does not feel comfortable starting anti-estrogen therapy at this time. Would like to re-discuss after her breast reconstruction in 2025. She has chronic pain in multiple joints- has had several joint replacements.   Imaging reviewed: Screening MMG/US 23- BIRADS 2 Screening MMG/US 24- spiculated masses with associated architectural distortion in the inner right breast, BIRADS0 Right breast diagnostic MMG/US 24- three suspicious RIGHT breast masses, recommend USG biopsy, BIRADS 5 Breast MRI 10/11/24- Multifocal malignancy right breast with biopsy proven malignancies corresponding to tissue markers with measurements and positions as above. A third site of malignancy is seen medially. Abnormal 2.5 cm enhancement upper outer quadrant right breast. No evidence for contralateral disease.  Pathology reviewed: Right breast biopsy x 2 - 24 2 o clock- ILC, pleomorphic type, mod diff, measures 9mm, solid carcinoma in situ (int grade), ER+ 90%, CA+ 80%, Her2 negative (1+) 1 o clock- ILC, similar to part 1, measures 10mm, solid carcinoma in situ (int grade)  Bilateral mastectomy- 24 Left prophlactic mastectomy- benign breast tissue Right mastectomy & SLNBx 24- ILC and LCIS, mixed classic and pleomorphic types, LN 0/5 Multiple foci of invasive carcinoma measuring 12mm, 10mm, 9mm, 2mm and 1.2mm pT1c,N0  Genetics: [facility] 10/15/24- no pathogenic mutations or VUS    HCM: - Colonoscopy:  last done 2023 - diverticulosis  - Gyn/pap:  sees annually ([place]) - Breast imaging:   as above - Lung cancer screen: n/a - Bone density: DEXA - last 1-2 years (at [facility])   SH: - Occupation:  Retired, 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

## 2025-02-04 NOTE — HISTORY OF PRESENT ILLNESS
[de-identified] : Referred by: Dr. Denisse Donaldson PCP: Dr. Nik Boyle  Diagnosis: Right breast cancer   - Fito Jack -  [phone] Daughter - Guero Reed - [phone]   Padmini Reed is a post-menopausal female who presented at age 69 in 2025 for evaluation of right breast cancer.  The patient has a medical history of HTN, HLD (improved), hay fever and overweight (on Wegovy x 8 hrs), left breast wound infection.  Planned for tissue reconstruction in 2025.  Surgical hx:  bilateral knee replacements, left hip replacement,  delivery (), bilateral mastectomies w/ TE's (2024).   Padmini underwent screening breast imaging in 2024 which revealed multiple masses in the right breast, confirmed on diagnostic imaging. She denied any breast complaints at that time including breast mass, nipple discharge or breast pain. R breast biopsy x 2 was performed on 24 and revealed invasive lobular carcinoma at both sites, measuring 9mm and 10mm, ER+ 90%, ND+ 80%, Her2 negative (1+). Breast MRI was performed and showed multifocal breast malignancy, no evidence of contralateral disease.  She ultimately underwent bilateral mastectomy on 24 with Dr. Denisse Donaldson- the left breast was benign and the right breast was notable for ILC and LCIS, mixed classic and pleomorphic types, LN 0/5, there were multiple foci of invasive carcinoma measuring 12mm, 10mm, 9mm, 2mm and 1.2mm, final staging, pT1c,N0.   Oncotype score (from largest foci) resulted at 16. 4% distant recurrence at 9 years, <1% CT benefit. Reviewed with Dr. Donaldson, no need for chemo based on this result. Additional Oncotypes done on 10mm and 9mm focis of tumor resulted at: 22; 8% distant recurrence risk at 9 years and <1 % average absolute CT benefit and 18; 5% distant recurrence risk at 9 years and <1 % average absolute CT benefit. Referred to medical oncology to discuss antiestrogen therapy.   At today's visit she is generally feeling well. Pending flap reconstruction in April. Previously took hormone replacement therapy which she stopped upon diagnosis. Has lost 25lb on Wegovy. Spends the helton in [place]. She does not feel comfortable starting anti-estrogen therapy at this time. Would like to re-discuss after her breast reconstruction in 2025. She has chronic pain in multiple joints- has had several joint replacements.   Imaging reviewed: Screening MMG/US 23- BIRADS 2 Screening MMG/US 24- spiculated masses with associated architectural distortion in the inner right breast, BIRADS0 Right breast diagnostic MMG/US 24- three suspicious RIGHT breast masses, recommend USG biopsy, BIRADS 5 Breast MRI 10/11/24- Multifocal malignancy right breast with biopsy proven malignancies corresponding to tissue markers with measurements and positions as above. A third site of malignancy is seen medially. Abnormal 2.5 cm enhancement upper outer quadrant right breast. No evidence for contralateral disease.  Pathology reviewed: Right breast biopsy x 2 - 24 2 o clock- ILC, pleomorphic type, mod diff, measures 9mm, solid carcinoma in situ (int grade), ER+ 90%, ND+ 80%, Her2 negative (1+) 1 o clock- ILC, similar to part 1, measures 10mm, solid carcinoma in situ (int grade)  Bilateral mastectomy- 24 Left prophlactic mastectomy- benign breast tissue Right mastectomy & SLNBx 24- ILC and LCIS, mixed classic and pleomorphic types, LN 0/5 Multiple foci of invasive carcinoma measuring 12mm, 10mm, 9mm, 2mm and 1.2mm pT1c,N0  Genetics: [employer] 10/15/24- no pathogenic mutations or VUS    HCM: - Colonoscopy:  last done 2023 - diverticulosis  - Gyn/pap:  sees annually ([place]) - Breast imaging:   as above - Lung cancer screen: n/a - Bone density: DEXA - last 1-2 years (at 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

## 2025-02-04 NOTE — PHYSICAL EXAM
[Fully active, able to carry on all pre-disease performance without restriction] : Status 0 - Fully active, able to carry on all pre-disease performance without restriction [Normal] : affect appropriate [de-identified] : generally well appearing female, NAD, pleasant  [de-identified] : s/p bilateral mastectomy w/ TE in place, no palpable masses or LAD bilaterally

## 2025-02-04 NOTE — HISTORY OF PRESENT ILLNESS
[FreeTextEntry1] : Referred by Dr. Tanvir Dhillon PCP Nik Cannon MD  Patient is a 69-year-old, Myriad negative, white female here today for postop visit s/p 11/8/24 bilateral mastectomy, *Right therapeutic and Left prophylactic and right SLNBx with tissue expanders per Dr. Chaney for Right ILC x2 foci, 2:00 4N, 1:00 1N, ER+ 90%, HI+ 80%. Her2 negative (2 out of 3 abnormal lesions). 11/8/24 Surgical path: R mastectomy- ILC multiple foci largest 1.2cm? total of 5 foci. All margins negative, closest greater than 2mm. LCIS also present, classic and pleomorphic. 0/5LN negative. pT1cN0/5 Left mastectomy- benign breast tissue with fibrocystic changes including usual ductal hyperplasia and apocrine metaplasia. Benign nipple and areolar complex.  Recently returned from [place] and going back on tuesday.  Pt. had to get left implant removed due to infection with , was washed and new TE was placed. Had PICC line for IV antibiotics, followed with ID Dr. Pope, although she had pulled out her PICC incidentally. Completed Augmentin PO course and now no evidence of infection.  She saw Hem/Onc Dr. Frey, not sure about the AI, she was rec for it but she wants to think about it.  Initial Oncotype on primary (12mm foci) resulted at 16; Additional 10mm and 9mm foci receptor status was ER+ (%)/ [place] NEGATIVE/ HER2 negative. Due to the change in [place]-, additional Oncotypes sent.  Resulted at 22 and 18. Absolute CT benefit <1%  Fhx: ?Abdominal Cancer: Father, 91 y/o. Pt lives in [place] and [place] for half the year as well.

## 2025-02-04 NOTE — RESULTS/DATA
[FreeTextEntry1] : #Right breast cancer- ILC multifocal, ER/SC+, stage IA  Padmini underwent screening breast imaging in July 2024 which revealed multiple masses in the right breast, confirmed on diagnostic imaging.  R breast biopsy x 2 was performed on 9/27/24 and revealed invasive lobular carcinoma at both sites, measuring 9mm and 10mm, ER+ 90%, SC+ 80%, Her2 negative (1+). Breast MRI was performed and showed multifocal breast malignancy, no evidence of contralateral disease.  She ultimately underwent bilateral mastectomy on 11/8/24 with Dr. Denisse Donaldson- the left breast was benign and the right breast was notable for ILC and LCIS, mixed classic and pleomorphic types, LN 0/5, there were multiple foci of invasive carcinoma measuring 12mm, 10mm, 9mm, 2mm and 1.2mm, final staging, pT1c,N0.  We discussed the excellent prognosis of stage I, ER positive, SC positive, node negative breast cancer. We explained that recent data suggests that chemotherapy may be spared for many patients with this type of breast cancer (up to 85%). We discussed the use of Oncotype DX genomic profile to determine the risk of recurrence and benefit from chemotherapy based on the results of the Tailor Rx Study to better determine which patients should receive chemotherapy in addition to hormonal therapy.  Her oncotype scores resulted as 16, 22 and 18 for the 3 largest foci respectively. No role for chemotherapy at this time.  Referred to medical oncology to discuss antiestrogen therapy. We discussed she is a candidate for antiestrogen therapy - discussed options in detail including arimidex vs. tamoxifen. She does not feel comfortable starting anti-estrogen therapy at this time. Would like to re-discuss after her breast reconstruction in April 2025.  Genetics: [employer] 10/15/24- no pathogenic mutations or VUS  RT June 2025 to further discuss antiestrogen therapy. She understands increased risk of recurrence (potentially distant recurrence) without antiestrogen therapy.  All patient questions answered at today's visit. Patient urged to call the office with any questions or concerns.  I personally have spent a total of 75 minutes of time on the date of this encounter reviewing test results, documenting findings, coordinating care and directly consulting with the patient and/or designated family member. Greater than 50% of the face to face encounter time was spent on counseling and/or coordination of care for right breast cancer.

## 2025-02-04 NOTE — HISTORY OF PRESENT ILLNESS
[FreeTextEntry1] : Referred by Dr. Tanvir Dhillon PCP Nik Cannon MD  Patient is a 69-year-old, Myriad negative, white female here today for postop visit s/p 11/8/24 bilateral mastectomy, *Right therapeutic and Left prophylactic and right SLNBx with tissue expanders per Dr. Chaney for Right ILC x2 foci, 2:00 4N, 1:00 1N, ER+ 90%, [place]+ 80%. Her2 negative (2 out of 3 abnormal lesions). 11/8/24 Surgical path: R mastectomy- ILC multiple foci largest 1.2cm? total of 5 foci. All margins negative, closest greater than 2mm. LCIS also present, classic and pleomorphic. 0/5LN negative. pT1cN0/5 Left mastectomy- benign breast tissue with fibrocystic changes including usual ductal hyperplasia and apocrine metaplasia. Benign nipple and areolar complex.  Recently returned from [place] and going back on tuesday.  Pt. had to get left implant removed due to infection with , was washed and new TE was placed. Had PICC line for IV antibiotics, followed with ID Dr. Pope, although she had pulled out her PICC incidentally. Completed Augmentin PO course and now no evidence of infection.  She saw Hem/Onc Dr. Frey, not sure about the AI, she was rec for it but she wants to think about it.  Initial Oncotype on primary (12mm foci) resulted at 16; Additional 10mm and 9mm foci receptor status was ER+ (%)/ [place] NEGATIVE/ HER2 negative. Due to the change in [place]-, additional Oncotypes sent.  Resulted at 22 and 18. Absolute CT benefit <1%  Fhx: ?Abdominal Cancer: Father, 93 y/o. Pt lives in [place] and [place] for half the year as well.

## 2025-02-04 NOTE — PROCEDURE
[FreeTextEntry1] : [facility] [employer] [FreeTextEntry2] : Lymphedema monitoring [FreeTextEntry3] : The patient had a post-op SOZO measurement which I reviewed today. The score is WNL, see scanned to EMR. Bioimpedance spectroscopy helps identify the onset of lymphedema in an arm or leg before patients experience noticeable swelling. Research has shown that the early detection of lymphedema using L-Dex combined with treatment can reduce progression to chronic lymphedema by 95% in breast cancer patients. Whenever possible, patients are tested for baseline L-Dex score before cancer treatment begins and then are reassessed during regular follow-up visits using the SOZO device. Otherwise, this can be started postoperatively and continued during regular follow-up visits. If the patients L-Dex score increases above normal levels, that is a sign that lymphedema is developing and a referral is made to physical therapy for further evaluation and early compression treatment. Lymphedema assessment with the SOZO L-Dex score is recommended to be done every 3 months for the first 3 years and then every 6 months for years 4 and 5 followed by annually afterwards.

## 2025-02-04 NOTE — HISTORY OF PRESENT ILLNESS
[de-identified] : Referred by: Dr. Denisse Donaldson PCP: Dr. Nik Boyle  Diagnosis: Right breast cancer   - Fito Jack -  [phone] Daughter - Guero Reed - [phone]   Padmini Reed is a post-menopausal female who presented at age 69 in 2025 for evaluation of right breast cancer.  The patient has a medical history of HTN, HLD (improved), hay fever and overweight (on Wegovy x 8 hrs), left breast wound infection.  Planned for tissue reconstruction in 2025.  Surgical hx:  bilateral knee replacements, left hip replacement,  delivery (), bilateral mastectomies w/ TE's (2024).   Padmini underwent screening breast imaging in 2024 which revealed multiple masses in the right breast, confirmed on diagnostic imaging. She denied any breast complaints at that time including breast mass, nipple discharge or breast pain. R breast biopsy x 2 was performed on 24 and revealed invasive lobular carcinoma at both sites, measuring 9mm and 10mm, ER+ 90%, [place]+ 80%, Her2 negative (1+). Breast MRI was performed and showed multifocal breast malignancy, no evidence of contralateral disease.  She ultimately underwent bilateral mastectomy on 24 with Dr. Denisse Donaldson- the left breast was benign and the right breast was notable for ILC and LCIS, mixed classic and pleomorphic types, LN 0/5, there were multiple foci of invasive carcinoma measuring 12mm, 10mm, 9mm, 2mm and 1.2mm, final staging, pT1c,N0.   Oncotype score (from largest foci) resulted at 16. 4% distant recurrence at 9 years, <1% CT benefit. Reviewed with Dr. Donaldson, no need for chemo based on this result. Additional Oncotypes done on 10mm and 9mm focis of tumor resulted at: 22; 8% distant recurrence risk at 9 years and <1 % average absolute CT benefit and 18; 5% distant recurrence risk at 9 years and <1 % average absolute CT benefit. Referred to medical oncology to discuss antiestrogen therapy.   At today's visit she is generally feeling well. Pending flap reconstruction in April. Previously took hormone replacement therapy which she stopped upon diagnosis. Has lost 25lb on Wegovy. Spends the [place] in [place]. She does not feel comfortable starting anti-estrogen therapy at this time. Would like to re-discuss after her breast reconstruction in 2025. She has chronic pain in multiple joints- has had several joint replacements.   Imaging reviewed: Screening MMG/US 23- BIRADS 2 Screening MMG/US 24- spiculated masses with associated architectural distortion in the inner right breast, BIRADS0 Right breast diagnostic MMG/US 24- three suspicious RIGHT breast masses, recommend USG biopsy, BIRADS 5 Breast MRI 10/11/24- Multifocal malignancy right breast with biopsy proven malignancies corresponding to tissue markers with measurements and positions as above. A third site of malignancy is seen medially. Abnormal 2.5 cm enhancement upper outer quadrant right breast. No evidence for contralateral disease.  Pathology reviewed: Right breast biopsy x 2 - 24 2 o clock- ILC, pleomorphic type, mod diff, measures 9mm, solid carcinoma in situ (int grade), ER+ 90%, [place]+ 80%, Her2 negative (1+) 1 o clock- ILC, similar to part 1, measures 10mm, solid carcinoma in situ (int grade)  Bilateral mastectomy- 24 Left prophlactic mastectomy- benign breast tissue Right mastectomy & SLNBx 24- ILC and LCIS, mixed classic and pleomorphic types, LN 0/5 Multiple foci of invasive carcinoma measuring 12mm, 10mm, 9mm, 2mm and 1.2mm pT1c,N0  Genetics: [employer] 10/15/24- no pathogenic mutations or VUS    HCM: - Colonoscopy:  last done 2023 - diverticulosis  - Gyn/pap:  sees annually ([place]) - Breast imaging:   as above - Lung cancer screen: n/a - Bone density: DEXA - last 1-2 years (at [facility])   SH: - Occupation:  Retired, 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

## 2025-02-04 NOTE — CONSULT LETTER
[Dear  ___] : Dear  [unfilled], [Consult Letter:] : I had the pleasure of evaluating your patient, [unfilled]. [Please see my note below.] : Please see my note below. [Consult Closing:] : Thank you very much for allowing me to participate in the care of this patient.  If you have any questions, please do not hesitate to contact me. [Sincerely,] : Sincerely, [FreeTextEntry2] : Dr. Denisse Donaldson [FreeTextEntry3] : Dr. Nicolle Frey [DrAnthony  ___] : Dr. LEE

## 2025-02-04 NOTE — PROCEDURE
[FreeTextEntry1] : [facility] [FreeTextEntry2] : Lymphedema monitoring [FreeTextEntry3] : The patient had a post-op SOZO measurement which I reviewed today. The score is WNL, see scanned to EMR. Bioimpedance spectroscopy helps identify the onset of lymphedema in an arm or leg before patients experience noticeable swelling. Research has shown that the early detection of lymphedema using L-Dex combined with treatment can reduce progression to chronic lymphedema by 95% in breast cancer patients. Whenever possible, patients are tested for baseline L-Dex score before cancer treatment begins and then are reassessed during regular follow-up visits using the SOZO device. Otherwise, this can be started postoperatively and continued during regular follow-up visits. If the patients L-Dex score increases above normal levels, that is a sign that lymphedema is developing and a referral is made to physical therapy for further evaluation and early compression treatment. Lymphedema assessment with the SOZO L-Dex score is recommended to be done every 3 months for the first 3 years and then every 6 months for years 4 and 5 followed by annually afterwards.

## 2025-02-04 NOTE — PHYSICAL EXAM
[Fully active, able to carry on all pre-disease performance without restriction] : Status 0 - Fully active, able to carry on all pre-disease performance without restriction [Normal] : affect appropriate [de-identified] : generally well appearing female, NAD, pleasant  [de-identified] : s/p bilateral mastectomy w/ TE in place, no palpable masses or LAD bilaterally

## 2025-02-04 NOTE — PHYSICAL EXAM
[Fully active, able to carry on all pre-disease performance without restriction] : Status 0 - Fully active, able to carry on all pre-disease performance without restriction [Normal] : affect appropriate [de-identified] : generally well appearing female, NAD, pleasant  [de-identified] : s/p bilateral mastectomy w/ TE in place, no palpable masses or LAD bilaterally

## 2025-02-04 NOTE — RESULTS/DATA
[FreeTextEntry1] : #Right breast cancer- ILC multifocal, ER/TN+, stage IA  Padmini underwent screening breast imaging in July 2024 which revealed multiple masses in the right breast, confirmed on diagnostic imaging.  R breast biopsy x 2 was performed on 9/27/24 and revealed invasive lobular carcinoma at both sites, measuring 9mm and 10mm, ER+ 90%, TN+ 80%, Her2 negative (1+). Breast MRI was performed and showed multifocal breast malignancy, no evidence of contralateral disease.  She ultimately underwent bilateral mastectomy on 11/8/24 with Dr. Denisse Donaldson- the left breast was benign and the right breast was notable for ILC and LCIS, mixed classic and pleomorphic types, LN 0/5, there were multiple foci of invasive carcinoma measuring 12mm, 10mm, 9mm, 2mm and 1.2mm, final staging, pT1c,N0.  We discussed the excellent prognosis of stage I, ER positive, TN positive, node negative breast cancer. We explained that recent data suggests that chemotherapy may be spared for many patients with this type of breast cancer (up to 85%). We discussed the use of Oncotype DX genomic profile to determine the risk of recurrence and benefit from chemotherapy based on the results of the Tailor Rx Study to better determine which patients should receive chemotherapy in addition to hormonal therapy.  Her oncotype scores resulted as 16, 22 and 18 for the 3 largest foci respectively. No role for chemotherapy at this time.  Referred to medical oncology to discuss antiestrogen therapy. We discussed she is a candidate for antiestrogen therapy - discussed options in detail including arimidex vs. tamoxifen. She does not feel comfortable starting anti-estrogen therapy at this time. Would like to re-discuss after her breast reconstruction in April 2025.  Genetics: [employer] 10/15/24- no pathogenic mutations or VUS  RT June 2025 to further discuss antiestrogen therapy. She understands increased risk of recurrence (potentially distant recurrence) without antiestrogen therapy.  All patient questions answered at today's visit. Patient urged to call the office with any questions or concerns.  I personally have spent a total of 75 minutes of time on the date of this encounter reviewing test results, documenting findings, coordinating care and directly consulting with the patient and/or designated family member. Greater than 50% of the face to face encounter time was spent on counseling and/or coordination of care for right breast cancer.

## 2025-02-04 NOTE — PHYSICAL EXAM
[de-identified] : CBE: Bilat mastectomy with expanders. No evidence of cellulitis or hematoma. Full ROM and no lymphedema.

## 2025-04-17 NOTE — REASON FOR VISIT
Per Dr. Valentino, patient to have MCOT monitor placed.  Diagnosis: History of CVA  Monitor placed: Yes  Patient Instructed: Yes  Patient verbalized understanding: Yes  [place] SN: [id number]    Phuc Bragg, Clinic Assistant on 4/17/2025 at 12:29 PM       [Consultation] : a consultation visit

## 2025-04-22 NOTE — ASSESSMENT
[FreeTextEntry1] : We reviewed at length the R/B/A of abdominally based autologous reconstruction, including, but not limited to complete flap failure, scarring, poor wound healing, bleeding/hematoma, infection, return to OR for attempted salvage. We also discussed the perioperative course, including postoperative monitoring and drains and expected length of surgery and hospital stay. She is aware that abdominal weakness is also a possibility. She knows that it is very hard to predict if she would experience the same band-like or belt-like discomfort that her friend had.   She has opted to have John replaced if needed for flap failure. She is to stop her Wegovy the week before. She is to stop vaping and smoking marijuana now. I will ask the anesthesia team if CBD or THC edibles are OK.  All of her questions were answered; she wishes to proceed.

## 2025-04-22 NOTE — PHYSICAL EXAM
[NI] : Normal [de-identified] : NL respiratory effort noted  [de-identified] : Bilateral tissue expanders in place. In good position, and a good size. [de-identified] : No breasts, s/p bilateral mastectomy.  [de-identified] : Lower abdominal excess adipose and tissue laxity.

## 2025-04-22 NOTE — HISTORY OF PRESENT ILLNESS
[FreeTextEntry1] : Pt reports she is looking forward to surgery, even though she is a bit nervous about it. She has an acquaintance who told her that she had VICENTE surgery and it feels like she has a belt around her waist. Pt also noted that this person had "awful scars" and that they were big circles where the NAC used to be. She has questions about when to stop her Wegovy and about vaping and smoking marijuana. She denies smoking any cigarettes.

## 2025-04-22 NOTE — REASON FOR VISIT
[Follow-Up: _____] : a [unfilled] follow-up visit [FreeTextEntry1] : For follow up before surgery. no concerns at this time.

## 2025-05-09 NOTE — ASSESSMENT
[FreeTextEntry1] : 69 year old female with PMH of OA (s/p L hip replacement 2019, b/l TKA 2011), family history of CAD, former tobacco use (quit 40-50 years ago), and recent diagnosis of breast cancer s/p b/l mastectomy who presents for pre-operative cardiovascular risk assessment for reconstructive surgery.    Patient is active without exertional symptoms, no history of chest pain, dyspnea, or palpitations.  BP is well controlled on current antihypertensive regimen. Prior lipid panel from 8/2024 showed  (current goal < 100mg/dL). 10 year ASCVD risk score is 8.2% intermediate risk.

## 2025-05-09 NOTE — DISCUSSION/SUMMARY
[FreeTextEntry1] : 1) HTN - Well controlled BP.  - Continue current regimen of telmisartan 80mg daily, chlorthalidone 25mg daily. - HTN to be managed by PCP unless she wants to continue following up with us.   2) HLD  - Lipid panel from 8/2024: , HDL 61, , TG 73. - Continue dietary and lifestyle modification for now, including Wegovy therapy for weight loss - If LDL remains elevated, consider starting moderate intensity statin therapy  3) Preoperative cardiovascular examination Given excellent reported exertional capacity without functional limitations, well controlled HTN, no additional cardiovascular evaluation is required prior to patient's upcoming procedure. From a cardiovascular perspective, patient may proceed with relatively low-risk breast surgery on 5/16/25.  Hold Wegovy for 1 week prior.   [EKG obtained to assist in diagnosis and management of assessed problem(s)] : EKG obtained to assist in diagnosis and management of assessed problem(s)

## 2025-05-09 NOTE — HISTORY OF PRESENT ILLNESS
[FreeTextEntry1] : Patient is a 69 year old female with PMH of OA (s/p L hip replacement 2019, b/l TKA 2011), family history of CAD, former tobacco use (quit 40-50 years ago), and recent diagnosis of breast cancer.  Underwent b/l mastectomy.  ? of post op infection.  Was given IV ABX.  No cardiac complications.  Presents for pre-operative cardiovascular risk assessment for planned reconstructive surgery.    Patient has no acute complaints and states that she is very active, performs aerobic exercise multiple times per week without any exertional symptoms.  Lives in [place] for the winter.  She denies any chest pain, dyspnea, palpitations, orthopnea, or lightheadedness. She reports compliance with her antihypertensive regimen of chlorthalidone and telmisartan. She reports intentional weight loss on Wegovy.  EKG today with NSR.    Prior Studies: Labs: Lipid Panel (8/2/2024): , HDL 61, , TG 73. A1c (10/30/24): 5.5%

## 2025-05-09 NOTE — PHYSICAL EXAM
[Normal] : well developed, well nourished, no acute distress [Normal S1, S2] : normal S1, S2 [No Murmur] : no murmur [No Rub] : no rub [No Gallop] : no gallop [Clear Lung Fields] : clear lung fields [No Edema] : no edema [Moves all extremities] : moves all extremities [Normal Speech] : normal speech [Alert and Oriented] : alert and oriented [No Respiratory Distress] : no respiratory distress

## 2025-05-26 NOTE — PHYSICAL EXAM
[NI] : Normal [de-identified] : NL respiratory effort noted  [de-identified] : Bilateral flaps viable. Skin paddles warm with normal color. Bilateral breast drains removed. Bilateral Simplilearn Doppler cables removed. [de-identified] : No breasts, s/p bilateral mastectomy.  [de-identified] : Abdominal incision intact with mild to moderate ecchymosis, left greater than right Superior aspect of the umbilicus is ischemic. Drains stripped, serosanguinous.  The left drain adapter is loose, but taped in place by pt with a bandaid.

## 2025-05-26 NOTE — ASSESSMENT
[FreeTextEntry1] : Stable postop after bilateral delayed VICENTE flap reconstruction. Continue to log abdominal drains output. Avoid tight compression on the abdomen. Advised to continue isometric tightening of the abdomen/core, but avoid any heavy lifting or straining.  follow up weekly

## 2025-05-26 NOTE — PHYSICAL EXAM
[NI] : Normal [de-identified] : NL respiratory effort noted  [de-identified] : Bilateral flaps viable. Skin paddles warm with normal color. Bilateral breast drains removed. Bilateral Povio Doppler cables removed. [de-identified] : No breasts, s/p bilateral mastectomy.  [de-identified] : Abdominal incision intact with mild to moderate ecchymosis, left greater than right Superior aspect of the umbilicus is ischemic. Drains stripped, serosanguinous.  The left drain adapter is loose, but taped in place by pt with a bandaid.

## 2025-05-26 NOTE — HISTORY OF PRESENT ILLNESS
[FreeTextEntry1] : Patient presents for follow up and states that she had suffered from constipation and had taken laxatives and a suppository to have a bowel movement.  She continues to take oxycodone, one in the am and one in the pm as well as tylenol and motrin.  She has not had much of an appetite and states that she is uncomfortable but doing better every day.  Denies fever, chills, redness of skin, or rashes.  No other complaints.

## 2025-05-28 NOTE — ASSESSMENT
[FreeTextEntry1] : Doing well overall after bilateral VICENTE  flaps. Continue wound care and recording left abd drain. Follow up weekly.

## 2025-05-28 NOTE — PHYSICAL EXAM
[NI] : Normal [de-identified] : NL respiratory effort noted  [de-identified] : Bilateral flaps viable. Skin paddles warm with normal color. Bilateral ecchymosis and edema, left greater than right. No hematoma. [de-identified] : No breasts, s/p bilateral mastectomy.  [de-identified] : Abdominal incision intact with mild to moderate ecchymosis, left greater than right Superior aspect of the umbilicus with minimal skin edge necrosis. Drains stripped, serosanguinous.  Right abd drain removed.  Adapter place on the left so it stops falling off. Left drain is putting out about 200 cc per day and leaking around as well. Very thin, watery serosang fluid.

## 2025-05-28 NOTE — HISTORY OF PRESENT ILLNESS
[FreeTextEntry1] : Pt reports she is still having intermittent pain on the left side as well as a dry cough and back pain. She is taking one oxy per day, at night with significant relief. during the day, she is just taking tylenol.

## 2025-06-04 NOTE — PHYSICAL EXAM
[NI] : Normal [de-identified] : Incisions are C/D/I bilaterally. Skin paddles are smooth and soft Left reconstructed breast with a firm area, (resolving hematoma vs fat necrosis), less than last visit, nontender [de-identified] : No breasts bilaterally, s/p bilateral mastectomy. [de-identified] : Incision is healing well with one 1.5 cm area centrally of necrosis that is debrided today with scissors and forceps.   HALIMA drain remains in place with dressing changed and drain stripped.

## 2025-06-04 NOTE — ADDENDUM
[FreeTextEntry1] : All medical record entries were at my direction (Dr. Kaleigh Chaney). I have reviewed the chart and agree that the record accurately reflects the history, physical exam, assessment and plan. The drain is still putting out over 150 cc per day, so this was left in.  A CHG tegaderm drain dressing was placed. No swimming or immersion yet.

## 2025-06-04 NOTE — HISTORY OF PRESENT ILLNESS
[FreeTextEntry1] : Patient presents for follow up and states that she is feeling really good and that she hopes that her drain can be removed.  However, she states that the drain is putting out a lot of fluid.  She states that she has been active but is not pushing it and has not lifted anything since surgery.  She wants to know when she can swim.  She denies any pain, fever, chills, redness of incisions or other complaints.

## 2025-06-04 NOTE — ASSESSMENT
[FreeTextEntry1] : 68 y/o female for follow up   Healing well Patient is advised to continue to empty and record her drain output. Patient is advised to apply vaseline to her incisions. Follow up in one week. Plan and patient status as per Dr Chaney. All questions and concerns addressed.

## 2025-06-11 NOTE — HISTORY OF PRESENT ILLNESS
[FreeTextEntry1] : Patient presents for follow up and states that she is hopeful that the drain can come out as she is getting frustrated.  She states that the drain occasionally leaks, and she has added Telfa to the ABD pad on the drain site.  She has completed the oxycodone and continues to wake up during the night because the drain is pressing into her side.  No redness, swelling, fever or chills.  No other complaints.

## 2025-06-11 NOTE — ASSESSMENT
[FreeTextEntry1] : 68 y/o female for follow up   Healing well Patient is advised to continue to empty and record output from her remaining drain. Follow up in one week All questions and concerns addressed. Plan and patient status as per Dr Cahney.

## 2025-06-11 NOTE — ADDENDUM
[FreeTextEntry1] : All medical record entries were at my direction (Dr. Kaleigh Chaney). I have reviewed the chart and agree that the record accurately reflects the history, physical exam, assessment and plan.n

## 2025-06-11 NOTE — PHYSICAL EXAM
[NI] : Normal [de-identified] : Incisions are C/D/I bilaterally with skin paddles are smooth and soft Left reconstructed breast with a firm area, smaller than last visit, nontender [de-identified] : No breasts, s/p bilateral mastectomy. [de-identified] : Incision is healing well with one 1.5 cm area of necrosis centrally that is lightly debrided with gauze HALIMA drain remains in place with dressing changed and drain stripped.  Output is clear serous.

## 2025-06-11 NOTE — ASSESSMENT
[FreeTextEntry1] : 70 y/o female for follow up   Healing well Patient is advised to continue to empty and record output from her remaining drain. Follow up in one week All questions and concerns addressed. Plan and patient status as per Dr hCaney.

## 2025-06-11 NOTE — PHYSICAL EXAM
[NI] : Normal [de-identified] : Incisions are C/D/I bilaterally with skin paddles are smooth and soft Left reconstructed breast with a firm area, smaller than last visit, nontender [de-identified] : No breasts, s/p bilateral mastectomy. [de-identified] : Incision is healing well with one 1.5 cm area of necrosis centrally that is lightly debrided with gauze HALIMA drain remains in place with dressing changed and drain stripped.  Output is clear serous.

## 2025-06-25 NOTE — ASSESSMENT
[FreeTextEntry1] : 68 y/o female for follow up   Healing well Patient is advised to continue to apply vaseline to the incisions especially in the area that has some necrosis on her abdomen.  All questions and concerns addressed. Plan and patient status as per Dr Chaney

## 2025-06-25 NOTE — HISTORY OF PRESENT ILLNESS
[FreeTextEntry1] : Patient presents for follow up and states that she is doing well.  She has not been applying anything to her incisions and states that she would really like to go into the beach or pool.  She has no other complaints today.

## 2025-06-25 NOTE — PHYSICAL EXAM
[NI] : Normal [de-identified] : Incisions are C/D/I bilaterally with skin paddles are smooth and soft Left reconstructed breast with a firm area, much softer than last visit.    [de-identified] : No breasts, s/p bilateral mastectomy. [de-identified] : Incision is healing well with one 1.5 cm area of necrosis centrally. It is dry and crusted. Vaseline applied.  No erythema, no purulent drainage. No surrounding induration.

## 2025-06-25 NOTE — PHYSICAL EXAM
[NI] : Normal [de-identified] : Incisions are C/D/I bilaterally with skin paddles are smooth and soft Left reconstructed breast with a firm area, much softer than last visit.    [de-identified] : No breasts, s/p bilateral mastectomy. [de-identified] : Incision is healing well with one 1.5 cm area of necrosis centrally. It is dry and crusted. Vaseline applied.  No erythema, no purulent drainage. No surrounding induration.

## 2025-06-25 NOTE — ADDENDUM
[FreeTextEntry1] : All medical record entries were at my direction (Dr. Kaleigh Chaney). I have reviewed the chart and agree that the record accurately reflects the history, physical exam, assessment and plan. Wounds continue to improve. OK to briefly submerge in the pool with the abdominal wound covered as long as she removes the dressing and showers afterward. Keep follow up in two weeks.

## 2025-07-09 NOTE — PHYSICAL EXAM
[NI] : Normal [de-identified] : Incisions are C/D/I bilaterally with skin paddles are smooth and soft, but visible through t-shirts. Left reconstructed breast with a firm area, (likely hematoma), much softer than last visit.   Right IMF is lower than left .  [de-identified] : No breasts bilaterally, s/p bilateral mastectomy. [de-identified] : Incision is healing well with one 1.5 cm area centrally of breakdown, no longer necrotic, granulation tissue noted with some exudate that is wiped away with gauze. Vaseline applied.  Left abdominal incision with a step off noted, firm, nontender

## 2025-07-09 NOTE — HISTORY OF PRESENT ILLNESS
[FreeTextEntry1] : Patient presents for follow up today and states that there continues to be one small area of her abdominal incision that is taking a bit longer to heal.  She has been applying vaseline to it and covering it when she goes in the pool. She states that her right reconstructed breast hangs a little bit lower than the left.  She also states that there is a bulge above her abdominal incision that she hopes will go down over time. She has no other complaints today.

## 2025-07-09 NOTE — ASSESSMENT
[FreeTextEntry1] : 71 y/o female for follow up   Healing well. Plan is to follow up in two weeks to ensure the abdominal incision is healing centrally. All questions and concerns addressed including the need for revision in the future. Plan and patient status as per Dr Chaney.

## 2025-07-24 NOTE — ASSESSMENT
[FreeTextEntry1] : 71 y/o female for follow up  Healed well Plan is for follow up in one month to discuss plans for revision surgery in the future. She has a visit with her orthopedist on 8/12/25 and will follow up after that visit when she has a better idea of surgical plan and timeline. She also has plans to return to [place] in December. All questions and concerns addressed. Plan and patient status as per Dr Chaney.

## 2025-07-24 NOTE — PHYSICAL EXAM
[NI] : Normal [de-identified] : Incisions are C/D/I bilaterally with skin paddles are smooth and soft. Left reconstructed breast with a firm area superiorly, softer than last visit.   [de-identified] : No breasts bilaterally, s/p bilateral mastectomy. [de-identified] : Incision is healing well with no further open areas noted.  Left abdominal incision with a tethered area on the laterally.

## 2025-07-24 NOTE — ADDENDUM
[FreeTextEntry1] : All medical record entries were made at my direction (Dr. Kaleigh Chaney). I have examined the patient, reviewed the chart and agree that the record accurately reflects the history, physical exam, assessment and plan. Pt would like to have her shoulder surgery in the fall (replacement). She would then consider VICENTE flap revision in the Spring, after she returns from [place]. She seems to have some fat necrosis in the left upper pole, although it seems softer on this visit. Will continue to follow this.

## 2025-07-24 NOTE — PHYSICAL EXAM
[NI] : Normal [de-identified] : Incisions are C/D/I bilaterally with skin paddles are smooth and soft. Left reconstructed breast with a firm area superiorly, softer than last visit.   [de-identified] : No breasts bilaterally, s/p bilateral mastectomy. [de-identified] : Incision is healing well with no further open areas noted.  Left abdominal incision with a tethered area on the laterally.

## 2025-07-24 NOTE — HISTORY OF PRESENT ILLNESS
[FreeTextEntry1] : Patient presents for follow up and states that she is doing well.  She is scheduled to see a new orthopedist for her right shoulder in two weeks and is hopeful that she will have surgical repair of her right shoulder and possibly her right elbow in September.  She thinks that the area of firmness is still there in her left reconstructed breast. She denies any pain or open areas of the skin.  No other complaints today.